# Patient Record
Sex: FEMALE | Race: WHITE | NOT HISPANIC OR LATINO | Employment: FULL TIME | URBAN - METROPOLITAN AREA
[De-identification: names, ages, dates, MRNs, and addresses within clinical notes are randomized per-mention and may not be internally consistent; named-entity substitution may affect disease eponyms.]

---

## 2018-02-23 ENCOUNTER — APPOINTMENT (OUTPATIENT)
Dept: RADIOLOGY | Facility: MEDICAL CENTER | Age: 23
End: 2018-02-23
Payer: OTHER MISCELLANEOUS

## 2018-02-23 ENCOUNTER — HOSPITAL ENCOUNTER (EMERGENCY)
Facility: MEDICAL CENTER | Age: 23
End: 2018-02-23
Attending: EMERGENCY MEDICINE
Payer: OTHER MISCELLANEOUS

## 2018-02-23 VITALS
HEIGHT: 57 IN | TEMPERATURE: 98.7 F | HEART RATE: 56 BPM | BODY MASS INDEX: 23.73 KG/M2 | WEIGHT: 110 LBS | SYSTOLIC BLOOD PRESSURE: 108 MMHG | DIASTOLIC BLOOD PRESSURE: 71 MMHG | RESPIRATION RATE: 18 BRPM

## 2018-02-23 DIAGNOSIS — S16.1XXA STRAIN OF NECK MUSCLE, INITIAL ENCOUNTER: ICD-10-CM

## 2018-02-23 DIAGNOSIS — S39.012A BACK STRAIN, INITIAL ENCOUNTER: ICD-10-CM

## 2018-02-23 LAB
APPEARANCE UR: CLEAR
COLOR UR AUTO: YELLOW
GLUCOSE UR QL STRIP.AUTO: NEGATIVE MG/DL
HCG UR QL: NEGATIVE
KETONES UR QL STRIP.AUTO: NEGATIVE MG/DL
LEUKOCYTE ESTERASE UR QL STRIP.AUTO: ABNORMAL
NITRITE UR QL STRIP.AUTO: NEGATIVE
PH UR STRIP.AUTO: 5.5 [PH]
PROT UR QL STRIP: NEGATIVE MG/DL
RBC UR QL AUTO: NEGATIVE
SP GR UR: 1.02

## 2018-02-23 PROCEDURE — 70450 CT HEAD/BRAIN W/O DYE: CPT

## 2018-02-23 PROCEDURE — 81025 URINE PREGNANCY TEST: CPT

## 2018-02-23 PROCEDURE — 81002 URINALYSIS NONAUTO W/O SCOPE: CPT

## 2018-02-23 PROCEDURE — 99284 EMERGENCY DEPT VISIT MOD MDM: CPT

## 2018-02-23 PROCEDURE — A9270 NON-COVERED ITEM OR SERVICE: HCPCS | Performed by: EMERGENCY MEDICINE

## 2018-02-23 PROCEDURE — 700102 HCHG RX REV CODE 250 W/ 637 OVERRIDE(OP): Performed by: EMERGENCY MEDICINE

## 2018-02-23 PROCEDURE — 72128 CT CHEST SPINE W/O DYE: CPT

## 2018-02-23 PROCEDURE — 72125 CT NECK SPINE W/O DYE: CPT

## 2018-02-23 RX ORDER — CYCLOBENZAPRINE HCL 10 MG
10 TABLET ORAL ONCE
Status: COMPLETED | OUTPATIENT
Start: 2018-02-23 | End: 2018-02-23

## 2018-02-23 RX ORDER — IBUPROFEN 600 MG/1
600 TABLET ORAL ONCE
Status: COMPLETED | OUTPATIENT
Start: 2018-02-23 | End: 2018-02-23

## 2018-02-23 RX ADMIN — IBUPROFEN 600 MG: 600 TABLET, FILM COATED ORAL at 20:47

## 2018-02-23 RX ADMIN — CYCLOBENZAPRINE 10 MG: 10 TABLET, FILM COATED ORAL at 19:39

## 2018-02-23 ASSESSMENT — LIFESTYLE VARIABLES: DO YOU DRINK ALCOHOL: YES

## 2018-02-23 ASSESSMENT — PAIN SCALES - GENERAL
PAINLEVEL_OUTOF10: 8
PAINLEVEL_OUTOF10: 8

## 2018-02-23 NOTE — LETTER
Emergency Services     February 23, 2018    Patient: Sylwia Downing   YOB: 1995   Date of Visit: 2/23/2018       To Whom It May Concern:    Sylwia Downing was seen and treated in our emergency department on 2/23/2018.   Please excuse her from work 2/24/18 and 2/25/18.    Sincerely,         [facility], EMERGENCY DEPT  Dept: [phone]

## 2018-02-24 NOTE — DISCHARGE INSTRUCTIONS
Cervical Sprain  A cervical sprain is when the tissues (ligaments) that hold the neck bones in place stretch or tear.  HOME CARE   · Put ice on the injured area.  ¨ Put ice in a plastic bag.  ¨ Place a towel between your skin and the bag.  ¨ Leave the ice on for 15-20 minutes, 3-4 times a day.  · You may have been given a collar to wear. This collar keeps your neck from moving while you heal.  ¨ Do not take the collar off unless told by your doctor.  ¨ If you have long hair, keep it outside of the collar.  ¨ Ask your doctor before changing the position of your collar. You may need to change its position over time to make it more comfortable.  ¨ If you are allowed to take off the collar for cleaning or bathing, follow your doctor's instructions on how to do it safely.  ¨ Keep your collar clean by wiping it with mild soap and water. Dry it completely. If the collar has removable pads, remove them every 1-2 days to hand wash them with soap and water. Allow them to air dry. They should be dry before you wear them in the collar.  ¨ Do not drive while wearing the collar.  · Only take medicine as told by your doctor.  · Keep all doctor visits as told.  · Keep all physical therapy visits as told.  · Adjust your work station so that you have good posture while you work.  · Avoid positions and activities that make your problems worse.  · Warm up and stretch before being active.  GET HELP IF:  · Your pain is not controlled with medicine.  · You cannot take less pain medicine over time as planned.  · Your activity level does not improve as expected.  GET HELP RIGHT AWAY IF:   · You are bleeding.  · Your stomach is upset.  · You have an allergic reaction to your medicine.  · You develop new problems that you cannot explain.  · You lose feeling (become numb) or you cannot move any part of your body (paralysis).  · You have tingling or weakness in any part of your body.  · Your symptoms get worse. Symptoms include:  ¨ Pain,  soreness, stiffness, puffiness (swelling), or a burning feeling in your neck.  ¨ Pain when your neck is touched.  ¨ Shoulder or upper back pain.  ¨ Limited ability to move your neck.  ¨ Headache.  ¨ Dizziness.  ¨ Your hands or arms feel week, lose feeling, or tingle.  ¨ Muscle spasms.  ¨ Difficulty swallowing or chewing.  MAKE SURE YOU:   · Understand these instructions.  · Will watch your condition.  · Will get help right away if you are not doing well or get worse.     This information is not intended to replace advice given to you by your health care provider. Make sure you discuss any questions you have with your health care provider.     Document Released: 06/05/2009 Document Revised: 08/20/2014 Document Reviewed: 06/25/2014  [employer] Interactive Patient Education ©2016 [employer]      Muscle Strain  A muscle strain (pulled muscle) happens when a muscle is stretched beyond normal length. It happens when a sudden, violent force stretches your muscle too far. Usually, a few of the fibers in your muscle are torn. Muscle strain is common in athletes. Recovery usually takes 1-2 weeks. Complete healing takes 5-6 weeks.   HOME CARE   · Follow the PRICE method of treatment to help your injury get better. Do this the first 2-3 days after the injury:  ¨ Protect. Protect the muscle to keep it from getting injured again.  ¨ Rest. Limit your activity and rest the injured body part.  ¨ Ice. Put ice in a plastic bag. Place a towel between your skin and the bag. Then, apply the ice and leave it on from 15-20 minutes each hour. After the third day, switch to moist heat packs.  ¨ Compression. Use a splint or elastic bandage on the injured area for comfort. Do not put it on too tightly.  ¨ Elevate. Keep the injured body part above the level of your heart.  · Only take medicine as told by your doctor.  · Warm up before doing exercise to prevent future muscle strains.  GET HELP IF:   · You have more pain or puffiness (swelling)  in the injured area.  · You feel numbness, tingling, or notice a loss of strength in the injured area.  MAKE SURE YOU:   · Understand these instructions.  · Will watch your condition.  · Will get help right away if you are not doing well or get worse.     This information is not intended to replace advice given to you by your health care provider. Make sure you discuss any questions you have with your health care provider.     Document Released: 09/26/2009 Document Revised: 10/08/2014 Document Reviewed: 07/17/2014  [employer] Interactive Patient Education ©2016 [employer]

## 2018-02-24 NOTE — ED NOTES
Pt medicated for headache per orders.  at bedside to speak with pt about her results.Discharge instructions given to patient, a verbal understanding of all instructions was stated. IV removed, cathlon intact, site without s/s of infection. Pt preferred to walk out accompanied by friend VSS, all belongings accounted for.
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
## 2018-05-16 ENCOUNTER — HOSPITAL ENCOUNTER (OUTPATIENT)
Facility: MEDICAL CENTER | Age: 23
End: 2018-05-16
Attending: NURSE PRACTITIONER
Payer: MEDICAID

## 2018-05-16 ENCOUNTER — INITIAL PRENATAL (OUTPATIENT)
Dept: OBGYN | Facility: CLINIC | Age: 23
End: 2018-05-16
Payer: MEDICAID

## 2018-05-16 VITALS
WEIGHT: 106 LBS | SYSTOLIC BLOOD PRESSURE: 102 MMHG | BODY MASS INDEX: 21.37 KG/M2 | HEIGHT: 59 IN | DIASTOLIC BLOOD PRESSURE: 52 MMHG

## 2018-05-16 DIAGNOSIS — Z34.80 SUPERVISION OF OTHER NORMAL PREGNANCY, ANTEPARTUM: Primary | ICD-10-CM

## 2018-05-16 DIAGNOSIS — Z34.00 SUPERVISION OF NORMAL FIRST PREGNANCY, ANTEPARTUM: ICD-10-CM

## 2018-05-16 LAB
APPEARANCE UR: NORMAL
BILIRUB UR STRIP-MCNC: NORMAL MG/DL
COLOR UR AUTO: NORMAL
GLUCOSE UR STRIP.AUTO-MCNC: NEGATIVE MG/DL
KETONES UR STRIP.AUTO-MCNC: NEGATIVE MG/DL
LEUKOCYTE ESTERASE UR QL STRIP.AUTO: NORMAL
NITRITE UR QL STRIP.AUTO: NEGATIVE
PH UR STRIP.AUTO: 6.5 [PH] (ref 5–8)
PROT UR QL STRIP: NEGATIVE MG/DL
RBC UR QL AUTO: NEGATIVE
SP GR UR STRIP.AUTO: 1.01
UROBILINOGEN UR STRIP-MCNC: NORMAL MG/DL

## 2018-05-16 PROCEDURE — 87491 CHLMYD TRACH DNA AMP PROBE: CPT

## 2018-05-16 PROCEDURE — 81002 URINALYSIS NONAUTO W/O SCOPE: CPT | Performed by: NURSE PRACTITIONER

## 2018-05-16 PROCEDURE — 88175 CYTOPATH C/V AUTO FLUID REDO: CPT

## 2018-05-16 PROCEDURE — 87591 N.GONORRHOEAE DNA AMP PROB: CPT

## 2018-05-16 PROCEDURE — 59401 PR NEW OB VISIT: CPT | Performed by: NURSE PRACTITIONER

## 2018-05-16 RX ORDER — ONDANSETRON 4 MG/1
4 TABLET, ORALLY DISINTEGRATING ORAL EVERY 6 HOURS PRN
Qty: 10 TAB | Refills: 1 | Status: SHIPPED | OUTPATIENT
Start: 2018-05-16 | End: 2018-11-08 | Stop reason: SDUPTHER

## 2018-05-16 NOTE — PROGRESS NOTES
Pt here today for NOB visit  LMP: Unknown  Pt had US done at [facility] ER on 04/16/18. Pt states was seen due to nausea and fever.   WT:106 lb  BP: 102/52  Pt states she still having some nausea, but states is improving. States having a little bit of cramping that comes and goes.  Desires AFP  Pt states she recently had a pap smear done 4 months at the [facility] Pt states results were abnormal-   Good # [phone]

## 2018-05-16 NOTE — LETTER
Cystic Fibrosis Carrier Testing  Sylwia Dowinng    The following information is about a blood test that can be done to determine if you and/or your partner carry the gene for cystic fibrosis.    WHAT IS CYSTIC FIBROSIS?  · Cystic fibrosis (CF) is an inherited disease that affects more than 25,000 American children and young adults.  · Symptoms of CF vary but include lung congestion, pneumonia, diarrhea and poor growth.  Most people with CF have severe medical problems and some die at a young age.  Others have so few symptoms they are unaware they have CF.  · CF does not affect intelligence.  · Although there is no cure for CF at this time, scientists are making progress in improving treatment and in searching for a cure.  In the past many people with CF  at a very young age.  Today, many are living into their 20’s and 30’s.    IS THERE A CHANCE MY BABY COULD HAVE CYSTIC FIBROSIS?  · You can have a child with CF even if there is no history in your family (see chart below).  · CF testing can help determine if you are a carrier and at risk to have a child with CF.  Note: if both parents are carriers, there is a 1 in 4 (25%) chance with each pregnancy that they will have a child with CF.  · Carriers have one normal CF gene and one altered CF gene.  · People with CF have two altered CF genes.  · Most people have two normal copies of the CF gene.    Approximate risk that a couple with no family history of cystic fibrosis will have a child with cystic fibrosis:    Ethnic background / Risk     couple:  1 in 2,500   couple:  1 in 15,000            couple:  1 in 8,000     American couple:  1 in 32,000     WHAT TESTING IS AVAILABLE?  · There is a blood test that can be done to find out if you or your partner is a carrier.  · It is important to understand that CF carrier testing does not detect all CF carriers.  · If the test shows that you are both CF carriers, you unborn baby  can be tested to find out if the baby has CF.    HOW MUCH DOES IT COST TO HAVE CYSTIC FIBROSIS CARRIER TESTING?  · Cost and insurance coverage for CF carrier testing vary depending upon the laboratory used and your insurance policy.  · The average cost for CF carrier testing is $300 per person.  · Your genetic counselor can provide you with more information about cystic fibrosis carrier testing.    _____  Yes, I am interested in discussing carrier testing with a genetic counselor.    _____  No, I am not interested in CF carrier testing or in receiving more information about CF carrier testing.      Client signature: ________________________________________  5/16/2018

## 2018-05-16 NOTE — PROGRESS NOTES
S:  Sylwia Downing is a 22 y.o.  who presents for her new OB exam.  She is 10w2d with and DANITZA of Estimated Date of Delivery: 12/10/18 by [place] ultrasound. She has complaints of sore breasts, some nausea and occasional cramping  She is currently working as a . Some heavy lifting but no chemical exposure. One ER visits or previous care in this pregnancy at [place] for n/v with confirmation of pregnancy ultrasound done.      desires AFP.  declines CF.  Denies VB, LOF, or cramping.  Denies dysuria, vaginal DC. Reports no fetal movement.     Pt is engaged and lives with FOB.  Pregnancy is desired.      Past Medical History:   Diagnosis Date   • Peptic ulcer     at age 15 - 18 yrs old     Family History   Problem Relation Age of Onset   • Stroke Mother    • Cancer Father      had thyroid cancer   • Cancer Maternal Grandmother      had breast cancer   • Cancer Paternal Grandmother      unkown cancer   • Cancer Paternal Grandfather      had lung canser     Social History     Social History   • Marital status: Single     Spouse name: N/A   • Number of children: N/A   • Years of education: N/A     Occupational History   • Not on file.     Social History Main Topics   • Smoking status: Former Smoker     Packs/day: 0.50     Years: 3.00     Types: Cigarettes   • Smokeless tobacco: Never Used      Comment: Quit smoking when she found out she waspregnant   • Alcohol use No      Comment: rare   • Drug use: Yes     Types: Inhaled, Marijuana      Comment: smoked marijuana when she was in high school   • Sexual activity: Yes     Partners: Male     Birth control/ protection: Pill      Comment: Unplanned pregnancy     Other Topics Concern   • Not on file     Social History Narrative   • No narrative on file     OB History    Para Term  AB Living   1             SAB TAB Ectopic Molar Multiple Live Births                    # Outcome Date GA Lbr Juan/2nd Weight Sex Delivery Anes PTL Lv   1 Current     "               History of HSV I or II in self or partner: no  History of Thyroid problems: no    O:    Vitals:    05/16/18 1516   BP: 102/52   Weight: 48.1 kg (106 lb)   Height: 1.49 m (4' 10.66\")      See Prenatal Physical.    Wet mount: none      A:   1.  IUP @ 10w2d per [place]'s ultrasound        2.  S=D        3.  See problem list below       4       Patient Active Problem List    Diagnosis Date Noted   • Supervision of normal first pregnancy 05/16/2018         P:  1.  GC/CT & pap done        2.  Prenatal labs ordered - lab slip given        3.  Discussed PNV, diet, avoidances and adequate water intake        4.  NOB packet given        5.  Return to office in 4 wks        6.  Complete OB US in 8 wks          No orders of the defined types were placed in this encounter.      "

## 2018-05-16 NOTE — PROGRESS NOTES
"Subjective:      Sylwia Downing is a 22 y.o. female who presents with No chief complaint on file.            HPI    ROS       Objective:     /52   Ht 1.49 m (4' 10.66\")   Wt 48.1 kg (106 lb)   BMI 21.66 kg/m²      Physical Exam   Constitutional: She is oriented to person, place, and time. She appears well-developed and well-nourished.   HENT:   Head: Normocephalic.   Eyes: Pupils are equal, round, and reactive to light.   Neck: Normal range of motion. No thyromegaly present.   Cardiovascular: Normal rate, regular rhythm and normal heart sounds.    Pulmonary/Chest: Effort normal and breath sounds normal.   Abdominal: Soft. Bowel sounds are normal.   Genitourinary: Vagina normal and uterus normal.   Musculoskeletal: Normal range of motion.   Neurological: She is alert and oriented to person, place, and time.   Skin: Skin is warm and dry.   Psychiatric: She has a normal mood and affect. Her behavior is normal. Judgment and thought content normal.   Nursing note and vitals reviewed.              Assessment/Plan:     1. Supervision of other normal pregnancy, antepartum    - POCT Urinalysis  - US-OB 2ND 3RD TRI COMPLETE; Future  - PREG CNTR PRENATAL PN; Future  - THINPREP RFLX HPV ASCUS W/CTNG; Future    2. Supervision of normal first pregnancy        "

## 2018-05-18 LAB
C TRACH DNA GENITAL QL NAA+PROBE: NEGATIVE
CYTOLOGY REG CYTOL: NORMAL
N GONORRHOEA DNA GENITAL QL NAA+PROBE: NEGATIVE
SPECIMEN SOURCE: NORMAL

## 2018-06-14 ENCOUNTER — ROUTINE PRENATAL (OUTPATIENT)
Dept: OBGYN | Facility: CLINIC | Age: 23
End: 2018-06-14
Payer: MEDICAID

## 2018-06-14 VITALS — BODY MASS INDEX: 22.88 KG/M2 | DIASTOLIC BLOOD PRESSURE: 62 MMHG | WEIGHT: 112 LBS | SYSTOLIC BLOOD PRESSURE: 100 MMHG

## 2018-06-14 DIAGNOSIS — Z34.00 SUPERVISION OF NORMAL FIRST PREGNANCY, ANTEPARTUM: Primary | ICD-10-CM

## 2018-06-14 PROCEDURE — 90040 PR PRENATAL FOLLOW UP: CPT | Performed by: NURSE PRACTITIONER

## 2018-06-14 RX ORDER — PNV NO.95/FERROUS FUM/FOLIC AC 28MG-0.8MG
1 TABLET ORAL DAILY
Qty: 30 TAB | Refills: 6 | Status: ON HOLD | OUTPATIENT
Start: 2018-06-14 | End: 2018-12-15

## 2018-06-14 NOTE — PROGRESS NOTES
Pt here today for OB follow up  Pt states no complaints   Reports +  Good # [phone]  Pharmacy Confirmed.  Pt has U/S on 7/26/18  Pt declined AFP  Pt states will do Labs after OB appt.

## 2018-06-14 NOTE — PROGRESS NOTES
SUBJECTIVE:  Pt is a 22 y.o.   at 14w3d  gestation. Presents today for follow-up prenatal care. Had one visit to [place]. Wasn't feeling well at work and her employer wanted her to go to the hospital. Ultrasound done with records requested. Reports no issues at this time. N/V now resolving.  Reports perhaps positive  fetal movement. Denies cramping/contractions, bleeding or leaking of fluid. Denies dysuria, headaches, N/V, or other issues at this time. Generally feels well today.     OBJECTIVE:  - See prenatal vitals flow  -   Vitals:    18 1345   BP: 100/62   Weight: 50.8 kg (112 lb)      Labs - none  US - scheduled  US at [place] requested           ASSESSMENT:   - IUP at 14w3d    - S=D   -   Patient Active Problem List    Diagnosis Date Noted   • Supervision of normal first pregnancy 2018         PLAN:  - S/sx pregnancy and labor warning signs vs general discomforts discussed  - Fetal movements and kick counts reviewed   - Adequate hydration reinforced  - Nutrition/exercise/vitamin education; continued PNV  - patient is on her way to the lab now.

## 2018-06-26 ENCOUNTER — HOSPITAL ENCOUNTER (OUTPATIENT)
Dept: LAB | Facility: MEDICAL CENTER | Age: 23
End: 2018-06-26
Attending: NURSE PRACTITIONER
Payer: MEDICAID

## 2018-06-26 DIAGNOSIS — Z34.80 SUPERVISION OF OTHER NORMAL PREGNANCY, ANTEPARTUM: ICD-10-CM

## 2018-06-26 LAB
ABO GROUP BLD: NORMAL
APPEARANCE UR: CLEAR
BACTERIA #/AREA URNS HPF: ABNORMAL /HPF
BASOPHILS # BLD AUTO: 0.6 % (ref 0–1.8)
BASOPHILS # BLD: 0.08 K/UL (ref 0–0.12)
BILIRUB UR QL STRIP.AUTO: NEGATIVE
BLD GP AB SCN SERPL QL: NORMAL
COLOR UR: ABNORMAL
EOSINOPHIL # BLD AUTO: 0.2 K/UL (ref 0–0.51)
EOSINOPHIL NFR BLD: 1.6 % (ref 0–6.9)
EPI CELLS #/AREA URNS HPF: ABNORMAL /HPF
ERYTHROCYTE [DISTWIDTH] IN BLOOD BY AUTOMATED COUNT: 42.4 FL (ref 35.9–50)
GLUCOSE UR STRIP.AUTO-MCNC: NEGATIVE MG/DL
HBV SURFACE AG SER QL: NEGATIVE
HCT VFR BLD AUTO: 36.9 % (ref 37–47)
HGB BLD-MCNC: 12.7 G/DL (ref 12–16)
HIV 1+2 AB+HIV1 P24 AG SERPL QL IA: NON REACTIVE
HYALINE CASTS #/AREA URNS LPF: ABNORMAL /LPF
IMM GRANULOCYTES # BLD AUTO: 0.05 K/UL (ref 0–0.11)
IMM GRANULOCYTES NFR BLD AUTO: 0.4 % (ref 0–0.9)
KETONES UR STRIP.AUTO-MCNC: NEGATIVE MG/DL
LEUKOCYTE ESTERASE UR QL STRIP.AUTO: ABNORMAL
LYMPHOCYTES # BLD AUTO: 2.46 K/UL (ref 1–4.8)
LYMPHOCYTES NFR BLD: 19.9 % (ref 22–41)
MCH RBC QN AUTO: 31.5 PG (ref 27–33)
MCHC RBC AUTO-ENTMCNC: 34.4 G/DL (ref 33.6–35)
MCV RBC AUTO: 91.6 FL (ref 81.4–97.8)
MICRO URNS: ABNORMAL
MONOCYTES # BLD AUTO: 0.94 K/UL (ref 0–0.85)
MONOCYTES NFR BLD AUTO: 7.6 % (ref 0–13.4)
NEUTROPHILS # BLD AUTO: 8.63 K/UL (ref 2–7.15)
NEUTROPHILS NFR BLD: 69.9 % (ref 44–72)
NITRITE UR QL STRIP.AUTO: NEGATIVE
NRBC # BLD AUTO: 0 K/UL
NRBC BLD-RTO: 0 /100 WBC
PH UR STRIP.AUTO: 7 [PH]
PLATELET # BLD AUTO: 302 K/UL (ref 164–446)
PMV BLD AUTO: 10.4 FL (ref 9–12.9)
PROT UR QL STRIP: NEGATIVE MG/DL
RBC # BLD AUTO: 4.03 M/UL (ref 4.2–5.4)
RBC # URNS HPF: ABNORMAL /HPF
RBC UR QL AUTO: NEGATIVE
RH BLD: NORMAL
RUBV AB SER QL: 26.1 IU/ML
SP GR UR STRIP.AUTO: 1.01
TREPONEMA PALLIDUM IGG+IGM AB [PRESENCE] IN SERUM OR PLASMA BY IMMUNOASSAY: NON REACTIVE
UROBILINOGEN UR STRIP.AUTO-MCNC: 0.2 MG/DL
WBC # BLD AUTO: 12.4 K/UL (ref 4.8–10.8)
WBC #/AREA URNS HPF: ABNORMAL /HPF

## 2018-06-26 PROCEDURE — 36415 COLL VENOUS BLD VENIPUNCTURE: CPT

## 2018-06-26 PROCEDURE — 87389 HIV-1 AG W/HIV-1&-2 AB AG IA: CPT

## 2018-06-26 PROCEDURE — 86850 RBC ANTIBODY SCREEN: CPT

## 2018-06-26 PROCEDURE — 86900 BLOOD TYPING SEROLOGIC ABO: CPT

## 2018-06-26 PROCEDURE — 87340 HEPATITIS B SURFACE AG IA: CPT

## 2018-06-26 PROCEDURE — 86762 RUBELLA ANTIBODY: CPT

## 2018-06-26 PROCEDURE — 86901 BLOOD TYPING SEROLOGIC RH(D): CPT

## 2018-06-26 PROCEDURE — 85025 COMPLETE CBC W/AUTO DIFF WBC: CPT

## 2018-06-26 PROCEDURE — 87086 URINE CULTURE/COLONY COUNT: CPT

## 2018-06-26 PROCEDURE — 86780 TREPONEMA PALLIDUM: CPT

## 2018-06-26 PROCEDURE — 81001 URINALYSIS AUTO W/SCOPE: CPT

## 2018-06-28 LAB
BACTERIA UR CULT: NORMAL
SIGNIFICANT IND 70042: NORMAL
SITE SITE: NORMAL
SOURCE SOURCE: NORMAL

## 2018-07-12 ENCOUNTER — ROUTINE PRENATAL (OUTPATIENT)
Dept: OBGYN | Facility: CLINIC | Age: 23
End: 2018-07-12
Payer: MEDICAID

## 2018-07-12 VITALS — BODY MASS INDEX: 24.72 KG/M2 | SYSTOLIC BLOOD PRESSURE: 118 MMHG | DIASTOLIC BLOOD PRESSURE: 56 MMHG | WEIGHT: 121 LBS

## 2018-07-12 DIAGNOSIS — Z34.00 SUPERVISION OF NORMAL FIRST PREGNANCY, ANTEPARTUM: ICD-10-CM

## 2018-07-12 PROCEDURE — 90040 PR PRENATAL FOLLOW UP: CPT | Performed by: NURSE PRACTITIONER

## 2018-07-12 NOTE — PROGRESS NOTES
Pt here today for OB follow up  Pt states having some milky white discharge, denies odor or itch  Reports +  Good # [phone]  Pharmacy Confirmed.  U/S 7/26  Declined AFP

## 2018-07-12 NOTE — PROGRESS NOTES
SUBJECTIVE:  Pt is a 22 y.o.   at 18w3d  gestation. Presents today for follow-up prenatal care. Reports no issues at this time.  Reports  fetal movement. Denies cramping/contractions, bleeding or leaking of fluid. Denies dysuria, headaches, N/V, or other issues at this time. Generally feels well today.     OBJECTIVE:  - See prenatal vitals flow  -   Vitals:    18 1356   BP: 118/56   Weight: 54.9 kg (121 lb)                 ASSESSMENT:   - IUP at 18w3d    - S=D   -   Patient Active Problem List    Diagnosis Date Noted   • Supervision of normal first pregnancy 2018         PLAN:  - declination of AFP signed   - US   - S/sx pregnancy and labor warning signs vs general discomforts discussed  - Fetal movements and kick counts reviewed   - Adequate hydration reinforced  - Nutrition/exercise/vitamin education; continued PNV  - Pregnancy weight gain reccs reviewed, nutrition and exercise reviewed   - Anticipatory guidance given  - RTC in 4 weeks for follow-up prenatal care

## 2018-07-26 ENCOUNTER — APPOINTMENT (OUTPATIENT)
Dept: RADIOLOGY | Facility: IMAGING CENTER | Age: 23
End: 2018-07-26
Attending: NURSE PRACTITIONER
Payer: MEDICAID

## 2018-07-26 ENCOUNTER — DATING (OUTPATIENT)
Dept: OBGYN | Facility: CLINIC | Age: 23
End: 2018-07-26

## 2018-07-26 DIAGNOSIS — Z34.80 SUPERVISION OF OTHER NORMAL PREGNANCY, ANTEPARTUM: ICD-10-CM

## 2018-07-26 PROCEDURE — 76805 OB US >/= 14 WKS SNGL FETUS: CPT | Performed by: OBSTETRICS & GYNECOLOGY

## 2018-08-10 ENCOUNTER — ROUTINE PRENATAL (OUTPATIENT)
Dept: OBGYN | Facility: CLINIC | Age: 23
End: 2018-08-10
Payer: MEDICAID

## 2018-08-10 VITALS — WEIGHT: 123 LBS | SYSTOLIC BLOOD PRESSURE: 114 MMHG | DIASTOLIC BLOOD PRESSURE: 58 MMHG | BODY MASS INDEX: 25.13 KG/M2

## 2018-08-10 DIAGNOSIS — Z34.00 SUPERVISION OF NORMAL FIRST PREGNANCY, ANTEPARTUM: ICD-10-CM

## 2018-08-10 PROCEDURE — 90040 PR PRENATAL FOLLOW UP: CPT | Performed by: OBSTETRICS & GYNECOLOGY

## 2018-08-10 NOTE — PROGRESS NOTES
OB f/u. + fetal movement.  No VB, LOF or UC's.  Good phone # [phone]  3rd trimester lab orders pended and instructions given to patient  U/S done on 7-26-18  Pt denies any problems

## 2018-08-10 NOTE — PROGRESS NOTES
OB Followup;    22w4d    Patient Active Problem List    Diagnosis Date Noted   • Supervision of normal first pregnancy 05/16/2018       Vitals:    08/10/18 1557   BP: 114/58   Weight: 55.8 kg (123 lb)       Patient presents for followup of OB care. Currently doing well . Good fetal movement no leakage of fluid no contractions or vaginal bleeding        Size equals dates, normal fetal heart rate      Labs; patient given lab slip for CBC, GTT and RPR    Labor precautions given  Increase oral hydration  Discussed proper weight gain during pregnancy  Continue prenatal vitamins  Increase water intake  Reviewed our group's policy on prenatal visits with all providers in the group    Followup in  4 weeks

## 2018-08-31 ENCOUNTER — HOSPITAL ENCOUNTER (OUTPATIENT)
Dept: LAB | Facility: MEDICAL CENTER | Age: 23
End: 2018-08-31
Attending: OBSTETRICS & GYNECOLOGY
Payer: MEDICAID

## 2018-08-31 DIAGNOSIS — Z34.00 SUPERVISION OF NORMAL FIRST PREGNANCY, ANTEPARTUM: ICD-10-CM

## 2018-08-31 LAB
GLUCOSE 1H P 50 G GLC PO SERPL-MCNC: 122 MG/DL (ref 70–139)
HCT VFR BLD AUTO: 38.4 % (ref 37–47)
HGB BLD-MCNC: 12.9 G/DL (ref 12–16)
TREPONEMA PALLIDUM IGG+IGM AB [PRESENCE] IN SERUM OR PLASMA BY IMMUNOASSAY: NON REACTIVE

## 2018-08-31 PROCEDURE — 86780 TREPONEMA PALLIDUM: CPT

## 2018-08-31 PROCEDURE — 85014 HEMATOCRIT: CPT

## 2018-08-31 PROCEDURE — 82950 GLUCOSE TEST: CPT

## 2018-08-31 PROCEDURE — 85018 HEMOGLOBIN: CPT

## 2018-08-31 PROCEDURE — 36415 COLL VENOUS BLD VENIPUNCTURE: CPT

## 2018-09-12 ENCOUNTER — ROUTINE PRENATAL (OUTPATIENT)
Dept: OBGYN | Facility: CLINIC | Age: 23
End: 2018-09-12
Payer: MEDICAID

## 2018-09-12 VITALS — SYSTOLIC BLOOD PRESSURE: 110 MMHG | WEIGHT: 129 LBS | BODY MASS INDEX: 26.36 KG/M2 | DIASTOLIC BLOOD PRESSURE: 62 MMHG

## 2018-09-12 DIAGNOSIS — Z34.00 SUPERVISION OF NORMAL FIRST PREGNANCY, ANTEPARTUM: ICD-10-CM

## 2018-09-12 PROCEDURE — 90040 PR PRENATAL FOLLOW UP: CPT | Performed by: OBSTETRICS & GYNECOLOGY

## 2018-09-12 PROCEDURE — 90715 TDAP VACCINE 7 YRS/> IM: CPT | Performed by: OBSTETRICS & GYNECOLOGY

## 2018-09-12 PROCEDURE — 90471 IMMUNIZATION ADMIN: CPT | Performed by: OBSTETRICS & GYNECOLOGY

## 2018-09-12 NOTE — PROGRESS NOTES
Pt here today for OB follow up  Pt states no complaints  Reports +FM   Good # [phone]  Pharmacy Confirmed.  Chaperone offered and present.    ARTURO Sheet instructions given  Pt desires Tdap  Declines BTL

## 2018-09-12 NOTE — PROGRESS NOTES
S: Pt presents for routine OB follow up. Good fetal movement.  No contractions, vaginal bleeding, or leakage of fluid.    Questions answered.    O: /62   Wt 58.5 kg (129 lb)   BMI 26.36 kg/m²   Patients' weight gain, fluid intake and exercise level discussed.  Vitals, fundal height , fetal position, and FHR reviewed on flowsheet    Lab:  Recent Results (from the past 336 hour(s))   GLUCOSE 1HR GESTATIONAL    Collection Time: 18 11:22 AM   Result Value Ref Range    Glucose, Post Dose 122 70 - 139 mg/dL   HCT    Collection Time: 18 11:22 AM   Result Value Ref Range    Hematocrit 38.4 37.0 - 47.0 %   HGB    Collection Time: 18 11:22 AM   Result Value Ref Range    Hemoglobin 12.9 12.0 - 16.0 g/dL   T.PALLIDUM AB EIA    Collection Time: 18 11:22 AM   Result Value Ref Range    Syphilis, Treponemal Qual Non Reactive Non Reactive       A/P:  22 y.o.  at 27w2d presents for routine obstetric follow-up.  Size equals dates and/or scan    1.  Continue prenatal vitamins.  2.  Fetal kick counts.  3.  Exercise at least 30 minutes daily.  4.  Increase water intake.  5.  Labor precautions educated.  6.  Follow-up in 3 weeks.  7.  GBS at 35 weeks

## 2018-09-12 NOTE — LETTER
"Count Your Baby's Movements  Another step to a healthy delivery    Sylwia Chang,             Dept: [phone]    How Many Weeks Pregnant? 27w2d    Date to Begin Countin18              How to use this chart    One way for your physician to keep track of your baby's health is by knowing how often the baby moves (or \"kicks\") in your womb.  You can help your physician to do this by using this chart every day.    Every day, you should see how many hours it takes for your baby to move 10 times.  Start in the morning, as soon as you get up.    · First, write down the time your baby moves until you get to 10.  · Check off one box every time your baby moves until you get to 10.  · Write down the time you finished counting in the last column.  · Total how long it took to count up all 10 movements.  · Finally, fill in the box that shows how long this took.  After counting 10 movements, you no longer have to count any more that day.  The next morning, just start counting again as soon as you get up.    What should you call a \"movement\"?  It is hard to say, because it will feel different from one mother to another and from one pregnancy to the next.  The important thing is that you count the movements the same way throughout your pregnancy.  If you have more questions, you should ask your physician.    Count carefully every day!  SAMPLE:  Week 28    How many hours did it take to feel 10 movements?       Start  Time     1     2     3     4     5     6     7     8     9     10   Finish Time   Mon 8:20 ·  ·  ·  ·  ·  ·  ·  ·  ·  ·  11:40                  Sat               Sun                 IMPORTANT: You should contact your physician if it takes more than two hours for you to feel 10 movements.  Each morning, write down the time and start to count the movements of your baby.  Keep track by checking off one box every time you feel one movement.  When you have felt " "10 \"kicks\", write down the time you finished counting in the last column.  Then fill in the   box (over the check akanksha) for the number of hours it took.  Be sure to read the complete instructions on the previous page.            "

## 2018-09-24 ENCOUNTER — TELEPHONE (OUTPATIENT)
Dept: OBGYN | Facility: CLINIC | Age: 23
End: 2018-09-24

## 2018-09-24 NOTE — TELEPHONE ENCOUNTER
Pt called Co tingling and numbness in right arm from elbow to fingertips for 5 days. Pt says she is a  and has been drinking water, has not injured herself, and has no other pain. She did state that she had chest pain a few days ago but not since then.   Pt advised to wear wrist support at night and lower salt intake in diet due to it most likely being water retention, per consult with Klalie Hayes.   Pt requested a note for work as she took today off. Galilea FRAZIER ok 'd it and patient was advised to pick it up from the office.  Pt. Verbalized understanding and had no other questions or concerns.

## 2018-10-04 ENCOUNTER — ROUTINE PRENATAL (OUTPATIENT)
Dept: OBGYN | Facility: CLINIC | Age: 23
End: 2018-10-04
Payer: MEDICAID

## 2018-10-04 VITALS — SYSTOLIC BLOOD PRESSURE: 126 MMHG | WEIGHT: 135 LBS | DIASTOLIC BLOOD PRESSURE: 60 MMHG | BODY MASS INDEX: 27.58 KG/M2

## 2018-10-04 DIAGNOSIS — Z34.00 SUPERVISION OF NORMAL FIRST PREGNANCY, ANTEPARTUM: ICD-10-CM

## 2018-10-04 PROCEDURE — 90040 PR PRENATAL FOLLOW UP: CPT | Performed by: PHYSICIAN ASSISTANT

## 2018-10-04 PROCEDURE — 90471 IMMUNIZATION ADMIN: CPT | Performed by: PHYSICIAN ASSISTANT

## 2018-10-04 PROCEDURE — 90686 IIV4 VACC NO PRSV 0.5 ML IM: CPT | Performed by: PHYSICIAN ASSISTANT

## 2018-10-04 NOTE — PROGRESS NOTES
Pt has no complaints with cramping, bleeding or pain. +FM. Pt has had some numbness in R arm and hand, avis during day, where pt works as  and writes often with that hand. Pt has incr water, cut back on salt, which has helped a little, but pt to try bracing at night, cool packs prn. Call if not improving. Flu vaccine given today. Tour info given today. RTC 2 wk or sooner prn.

## 2018-10-04 NOTE — PROGRESS NOTES
Pt. Here for OB/FU today. Reports Good FM.   Good # [phone]  Pt states having nausea and numbness on her right arm.   Pharmacy verified.   Flu vaccine given today, right deltoid. Screening checklist reviewed with pt. Verified by DV

## 2018-10-18 ENCOUNTER — ROUTINE PRENATAL (OUTPATIENT)
Dept: OBGYN | Facility: CLINIC | Age: 23
End: 2018-10-18

## 2018-10-18 VITALS — WEIGHT: 138 LBS | SYSTOLIC BLOOD PRESSURE: 124 MMHG | DIASTOLIC BLOOD PRESSURE: 64 MMHG | BODY MASS INDEX: 28.2 KG/M2

## 2018-10-18 DIAGNOSIS — Z34.00 SUPERVISION OF NORMAL FIRST PREGNANCY, ANTEPARTUM: Primary | ICD-10-CM

## 2018-10-18 PROCEDURE — 90040 PR PRENATAL FOLLOW UP: CPT | Performed by: NURSE PRACTITIONER

## 2018-10-18 NOTE — PROGRESS NOTES
S) Pt is a 22 y.o.   at 32w3d  gestation. Routine prenatal care today. No complaints today. Was sick last week, but feeling much better. Right hand numbness is getting better too. Tdap and flu already done.  labor precautions discussed, all questions answered.    Fetal movement Normal  Cramping no  VB no  LOF no   Denies dysuria. Generally feels well today. Good self-care activities identified. Denies headaches, swelling, visual changes, or epigastric pain .     O) Blood pressure 124/64, weight 62.6 kg (138 lb).        Labs:       PNL: WNL       GCT: 122        AFP: Not Examined       GBS: N/A       Pertinent ultrasound -        18- Survey WNL, LORRIE 17.17cm, c/w prev dating.    A) IUP at 32w3d       S=D         Patient Active Problem List    Diagnosis Date Noted   • Supervision of normal first pregnancy 2018          SVE: deferred       Chaperone offered: n/a         TDAP: yes       FLU: yes        BTL: no       : n/a       C/S Consent: n/a       IOL or C/S scheduled: no       LAST PAP: 18- Negative         P) s/s ptl vs general discomforts. Fetal movements reviewed. General ed and anticipatory guidance. Nutrition/exercise/vitamin. Plans breast Plans pp contraception- unsure  Continue PNV.

## 2018-10-18 NOTE — PROGRESS NOTES
Pt here today for OB follow up  Pt states no complaints  Reports +  Good # [phone]  Pharmacy Confirmed.  Chaperone offered and none needed.

## 2018-11-01 ENCOUNTER — ROUTINE PRENATAL (OUTPATIENT)
Dept: OBGYN | Facility: CLINIC | Age: 23
End: 2018-11-01

## 2018-11-01 VITALS — BODY MASS INDEX: 27.99 KG/M2 | DIASTOLIC BLOOD PRESSURE: 56 MMHG | SYSTOLIC BLOOD PRESSURE: 102 MMHG | WEIGHT: 137 LBS

## 2018-11-01 DIAGNOSIS — Z34.03 SUPERVISION OF NORMAL FIRST PREGNANCY IN THIRD TRIMESTER: ICD-10-CM

## 2018-11-01 PROCEDURE — 90040 PR PRENATAL FOLLOW UP: CPT | Performed by: NURSE PRACTITIONER

## 2018-11-01 NOTE — PROGRESS NOTES
Pt here today for OB follow up  Reports +FM  WT: 137 lb  BP: 102/56  Pt states she has been having pain on fingers both hands x 1 week. Also reports having nausea in the evening for the past few days.   Fahad # [phone]

## 2018-11-01 NOTE — PROGRESS NOTES
SUBJECTIVE:  Pt is a 22 y.o.   at 34w3d  gestation. Presents today for follow-up prenatal care. Reports no issues at this time except for sore fingers due to hand and pedal edema.  Reports good  fetal movement. Denies cramping/contractions, bleeding or leaking of fluid. Denies dysuria, headaches, N/V, or other issues at this time. Generally feels well today.     OBJECTIVE:  - See prenatal vitals flow  -   Vitals:    18 1425   BP: 102/56   Weight: 62.1 kg (137 lb)      Labs - normal prenatal panel  US - normal fetal survey            ASSESSMENT:   - IUP at 34w3d    - S=D   -   Patient Active Problem List    Diagnosis Date Noted   • Supervision of normal first pregnancy 2018         PLAN:  - S/sx pregnancy and labor warning signs vs general discomforts discussed  - Fetal movements and kick counts reviewed   - Adequate hydration reinforced  - Nutrition/exercise/vitamin education; continued PNV   Anticipates GBS next visit.

## 2018-11-13 ENCOUNTER — TELEPHONE (OUTPATIENT)
Dept: OBGYN | Facility: CLINIC | Age: 23
End: 2018-11-13

## 2018-11-13 NOTE — TELEPHONE ENCOUNTER
Called patient, no answer. Left voicemail to let her know her FMLA is done and ready to be picked up. No fax number provided. Scanned into patient's chart and original documents given back to Galilea.

## 2018-11-15 ENCOUNTER — HOSPITAL ENCOUNTER (OUTPATIENT)
Facility: MEDICAL CENTER | Age: 23
End: 2018-11-15
Attending: NURSE PRACTITIONER
Payer: COMMERCIAL

## 2018-11-15 ENCOUNTER — ROUTINE PRENATAL (OUTPATIENT)
Dept: OBGYN | Facility: CLINIC | Age: 23
End: 2018-11-15
Payer: MEDICAID

## 2018-11-15 ENCOUNTER — ROUTINE PRENATAL (OUTPATIENT)
Dept: OBGYN | Facility: CLINIC | Age: 23
End: 2018-11-15

## 2018-11-15 VITALS — SYSTOLIC BLOOD PRESSURE: 118 MMHG | DIASTOLIC BLOOD PRESSURE: 66 MMHG | BODY MASS INDEX: 28.4 KG/M2 | WEIGHT: 139 LBS

## 2018-11-15 DIAGNOSIS — Z34.00 SUPERVISION OF NORMAL FIRST PREGNANCY, ANTEPARTUM: ICD-10-CM

## 2018-11-15 DIAGNOSIS — Z34.00 SUPERVISION OF NORMAL FIRST PREGNANCY, ANTEPARTUM: Primary | ICD-10-CM

## 2018-11-15 DIAGNOSIS — O36.8390 ANTEPARTUM VARIABLE DECELERATION: ICD-10-CM

## 2018-11-15 LAB
NST ACOUSTIC STIMULATION: NO
NST ACTION NECESSARY: NORMAL
NST ASSESSMENT: NORMAL
NST BASELINE: 110
NST INDICATIONS: NORMAL
NST OTHER DATA: NORMAL
NST READ BY: NORMAL
NST RETURN: NORMAL
NST UTERINE ACTIVITY: NORMAL

## 2018-11-15 PROCEDURE — 59025 FETAL NON-STRESS TEST: CPT | Performed by: NURSE PRACTITIONER

## 2018-11-15 PROCEDURE — 90040 PR PRENATAL FOLLOW UP: CPT | Performed by: NURSE PRACTITIONER

## 2018-11-15 NOTE — PROGRESS NOTES
S) Pt is a 23 y.o.   at 36w3d  gestation. Routine prenatal care today. 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  Did hospital tour, no PNC.   Fetal movement Normal  Cramping yes  VB no  LOF no   Denies dysuria. Generally feels well today. Good self-care activities identified. Denies headaches, swelling, visual changes, or epigastric pain .     O) Blood pressure 118/66, weight 63 kg (139 lb).        Labs:       PNL: WNL       GCT: 122       AFP: Not Examined       GBS: collected today       Pertinent ultrasound - 18- Survey WNL, LORRIE 17.17cm, c/w prev dating.       Several quick decelerations in baby's HR heard on doppler    A) IUP at 36w3d       S=D         Patient Active Problem List    Diagnosis Date Noted   • Supervision of normal first pregnancy 2018          SVE: deferred         TDAP: yes       FLU: yes        BTL: no       : no       C/S Consent: no       IOL or C/S scheduled: no       LAST PAP: 18 negative         P) s/s ptl vs general discomforts.   Fetal movements reviewed. General ed and anticipatory guidance. Nutrition/exercise/vitamin. Plans breast Plans pp contraception- unsure    GBS collected today  NST now.  RTC 1 week or PRN

## 2018-11-15 NOTE — PROGRESS NOTES
Pt here today for OB follow up  Reports +FM  WT: 139 lb  BP: 118/66  Pt states she gets occasional cramping during the day. Pt also reports white mucus white vaginal discharge, denies itching or odor. Denies any other concerns.   Good # [phone]

## 2018-11-17 LAB — GP B STREP DNA SPEC QL NAA+PROBE: NEGATIVE

## 2018-11-20 ENCOUNTER — TELEPHONE (OUTPATIENT)
Dept: OBGYN | Facility: CLINIC | Age: 23
End: 2018-11-20

## 2018-11-20 NOTE — TELEPHONE ENCOUNTER
[11/19/2018 10:57 AM] Ally Bhat:   Sylwia Downing       Female  1995 (23 yrs)  [id number]  MRN: [id number]      pt want her FMLA pappr work ASAP  left a vm  her could be jeopardy  if she dont get this  thanks  [phone]     I called pt back and notify her that he FMLA were done on 11/12/18, I will fax forms per pt request.   Has not further questions.

## 2018-11-25 ENCOUNTER — HOSPITAL ENCOUNTER (OUTPATIENT)
Facility: MEDICAL CENTER | Age: 23
End: 2018-11-25
Attending: OBSTETRICS & GYNECOLOGY | Admitting: OBSTETRICS & GYNECOLOGY
Payer: MEDICAID

## 2018-11-25 VITALS
WEIGHT: 140 LBS | BODY MASS INDEX: 30.2 KG/M2 | DIASTOLIC BLOOD PRESSURE: 71 MMHG | SYSTOLIC BLOOD PRESSURE: 123 MMHG | HEIGHT: 57 IN | HEART RATE: 94 BPM

## 2018-11-25 PROCEDURE — 59025 FETAL NON-STRESS TEST: CPT

## 2018-11-25 ASSESSMENT — PAIN SCALES - GENERAL
PAINLEVEL_OUTOF10: 0
PAINLEVEL_OUTOF10: 0

## 2018-11-26 NOTE — PROGRESS NOTES
1936- Pt presented to labor unit with decreased fetal movement. Denies UC's, VB, and LOF. States last time she felt the baby move was last night. Abd soft and non tender to touch. EFM and TOCO applied. Pt states last time she was in the Dr office, HR was in the 120's. POC discussed. Pt verbalized understanding.     1950- Pt states she hasn't had much to drink today. Pitcher of water given to pt and instructed to consume. Pt agreed. FOB at bedside.    2027- GAVIN Alcantara CNM notified of pt status. Reviewed fetal heart tracing remotely. Order received to discharge home undelivered.     2028- Discharge instructions given and explained. Both pt and FOB verbalized understanding.     2030- Pt discharged home undelivered with FOB.

## 2018-11-29 ENCOUNTER — ROUTINE PRENATAL (OUTPATIENT)
Dept: OBGYN | Facility: CLINIC | Age: 23
End: 2018-11-29
Payer: MEDICAID

## 2018-11-29 VITALS — DIASTOLIC BLOOD PRESSURE: 64 MMHG | SYSTOLIC BLOOD PRESSURE: 108 MMHG | BODY MASS INDEX: 30.73 KG/M2 | WEIGHT: 142 LBS

## 2018-11-29 DIAGNOSIS — Z34.00 SUPERVISION OF NORMAL FIRST PREGNANCY, ANTEPARTUM: Primary | ICD-10-CM

## 2018-11-29 PROCEDURE — 90040 PR PRENATAL FOLLOW UP: CPT | Performed by: NURSE PRACTITIONER

## 2018-11-29 NOTE — PROGRESS NOTES
Pt here today for OB follow up. Pt seen L&D 11/25 -FM  Pt states some UC's, feels head lower  Reports +FM  Good # [phone]  Pharmacy Confirmed.  Chaperone offered and present.

## 2018-11-29 NOTE — PROGRESS NOTES
S) Pt is a 23 y.o.   at 38w3d  gestation. Routine prenatal care today. Seen in L&D on  for decreased FM.  Fetal movement since has been normal. Feeling more pelvic pressure but no contractions yet.    Fetal movement Normal  Cramping no  VB no  LOF no   Denies dysuria. Generally feels well today. Good self-care activities identified. Denies headaches, swelling, visual changes, or epigastric pain .     O) Blood pressure 108/64, weight 64.4 kg (142 lb).        Labs:        PNL: WNL       GCT:122       AFP: Not Examined       GBS: negative       Pertinent ultrasound - 18- Survey WNL, LORRIE 17.17cm, c/w prev dating.           A) IUP at 38w3d       S=D         Patient Active Problem List    Diagnosis Date Noted   • Supervision of normal first pregnancy 2018          SVE: 1.5/70/-2         TDAP: yes       FLU: yes        BTL: no       : no       C/S Consent: no       IOL or C/S scheduled: yes - referral placed today       LAST PAP: 18 negative         P) Labour precautions discussed.   Fetal movements reviewed. General ed and anticipatory guidance. Nutrition/exercise/vitamin. Plans breast Plans pp contraception- unsure    Discussed IOL policy after 41 weeks.  Encouraged walking 30m/day.  RTC 1 week or PRN.

## 2018-12-03 ENCOUNTER — TELEPHONE (OUTPATIENT)
Dept: OBGYN | Facility: CLINIC | Age: 23
End: 2018-12-03

## 2018-12-03 NOTE — TELEPHONE ENCOUNTER
Pt walked into the clinic stating she is 39 weeks and is having contractions more often through out the day and would like to start her maternity leave a week early. Per consult with Kallie Hayes, a letter was signed and given to pt informing her work that pt start maternity leave today.

## 2018-12-06 ENCOUNTER — ROUTINE PRENATAL (OUTPATIENT)
Dept: OBGYN | Facility: CLINIC | Age: 23
End: 2018-12-06
Payer: MEDICAID

## 2018-12-06 VITALS — DIASTOLIC BLOOD PRESSURE: 78 MMHG | BODY MASS INDEX: 31.38 KG/M2 | WEIGHT: 145 LBS | SYSTOLIC BLOOD PRESSURE: 100 MMHG

## 2018-12-06 DIAGNOSIS — Z34.00 SUPERVISION OF NORMAL FIRST PREGNANCY, ANTEPARTUM: Primary | ICD-10-CM

## 2018-12-06 DIAGNOSIS — O36.8390 BRADYCARDIA FOUND ON MEASUREMENT OF BASELINE FETAL HEART RATE: ICD-10-CM

## 2018-12-06 PROCEDURE — 90040 PR PRENATAL FOLLOW UP: CPT | Performed by: NURSE PRACTITIONER

## 2018-12-06 NOTE — PROGRESS NOTES
S) Pt is a 23 y.o.   at 39w3d  gestation. Routine prenatal care today. Pt only c/o pressure and irregular ctx.    Fetal movement Normal  Cramping no  VB no  LOF no   Denies dysuria. Generally feels well today. Good self-care activities identified. Denies headaches, swelling, visual changes, or epigastric pain .     O) Blood pressure 100/78, weight 65.8 kg (145 lb).        Labs:       PNL: WNL       GCT:122       AFP: Not Examined       GBS: negative       Pertinent ultrasound - 18- Survey WNL, LORRIE 17.17cm, c/w prev dating.       Low baseline on auscultation with doppler between 102-108    A) IUP at 39w3d       S=D         Patient Active Problem List    Diagnosis Date Noted   • Supervision of normal first pregnancy 2018          SVE: 1.5/80/-2         TDAP: yes       FLU: yes        BTL: no       : no       C/S Consent: no       IOL or C/S scheduled: yes - no date yet       LAST PAP: 18 negative         P) Labour precautions discussed.   Fetal movements reviewed. General ed and anticipatory guidance. Nutrition/exercise/vitamin. Plans breast Plans pp contraception- unsure   NST now for baseline 105 BPM.    RTC 1 week or PRN

## 2018-12-06 NOTE — PROGRESS NOTES
Pt here today for OB follow up  Pt states pressure,back pain, UC's  Reports +FM  Good # [phone]  Pharmacy Confirmed.  Chaperone offered and declines chaperone.

## 2018-12-07 RX ORDER — ONDANSETRON 4 MG/1
4 TABLET, ORALLY DISINTEGRATING ORAL EVERY 6 HOURS PRN
Qty: 20 TAB | Refills: 1 | Status: ON HOLD | OUTPATIENT
Start: 2018-12-07 | End: 2018-12-15

## 2018-12-13 ENCOUNTER — ROUTINE PRENATAL (OUTPATIENT)
Dept: OBGYN | Facility: CLINIC | Age: 23
End: 2018-12-13
Payer: MEDICAID

## 2018-12-13 VITALS — DIASTOLIC BLOOD PRESSURE: 70 MMHG | SYSTOLIC BLOOD PRESSURE: 110 MMHG | WEIGHT: 147 LBS | BODY MASS INDEX: 31.81 KG/M2

## 2018-12-13 DIAGNOSIS — Z34.03 SUPERVISION OF NORMAL FIRST PREGNANCY IN THIRD TRIMESTER: Primary | ICD-10-CM

## 2018-12-13 PROCEDURE — 90040 PR PRENATAL FOLLOW UP: CPT | Performed by: NURSE PRACTITIONER

## 2018-12-13 NOTE — PROGRESS NOTES
SUBJECTIVE:  Pt is a 23 y.o.   at 40w3d  gestation. Presents today for follow-up prenatal care. Reports no issues at this time.  Induction of labor has not yet been scheduled. Reports good  fetal movement. Denies cramping/contractions, bleeding or leaking of fluid. Denies dysuria, headaches, N/V, or other issues at this time. Generally feels well today.     OBJECTIVE:  - See prenatal vitals flow  -   Vitals:    18 1305   BP: 110/70   Weight: 66.7 kg (147 lb)      Labs - normal prenatal panel, normal glucose  US - normal fetal survey            ASSESSMENT:   - IUP at 40w3d    - S=D   -   Patient Active Problem List    Diagnosis Date Noted   • Supervision of normal first pregnancy 2018         PLAN:  - S/sx pregnancy and labor warning signs vs general discomforts discussed  - Fetal movements and kick counts reviewed   - Adequate hydration reinforced  - Nutrition/exercise/vitamin education; continued PNV  Order placed for induction of labor to be scheduled.

## 2018-12-13 NOTE — PROGRESS NOTES
OB f/u. + fetal movement.  No VB, LOF or UC's.  Wt:147       BP:110/70  Good phone # [phone]  Preferred pharmacy confirmed.

## 2018-12-15 ENCOUNTER — APPOINTMENT (OUTPATIENT)
Dept: OBGYN | Facility: MEDICAL CENTER | Age: 23
End: 2018-12-15
Attending: OBSTETRICS & GYNECOLOGY
Payer: MEDICAID

## 2018-12-15 ENCOUNTER — HOSPITAL ENCOUNTER (INPATIENT)
Facility: MEDICAL CENTER | Age: 23
LOS: 4 days | End: 2018-12-19
Attending: OBSTETRICS & GYNECOLOGY | Admitting: OBSTETRICS & GYNECOLOGY
Payer: MEDICAID

## 2018-12-15 DIAGNOSIS — G89.18 POST-OP PAIN: ICD-10-CM

## 2018-12-15 LAB
BASOPHILS # BLD AUTO: 0.7 % (ref 0–1.8)
BASOPHILS # BLD: 0.11 K/UL (ref 0–0.12)
EOSINOPHIL # BLD AUTO: 0.24 K/UL (ref 0–0.51)
EOSINOPHIL NFR BLD: 1.5 % (ref 0–6.9)
ERYTHROCYTE [DISTWIDTH] IN BLOOD BY AUTOMATED COUNT: 45.8 FL (ref 35.9–50)
HCT VFR BLD AUTO: 40.4 % (ref 37–47)
HGB BLD-MCNC: 13.6 G/DL (ref 12–16)
HOLDING TUBE BB 8507: NORMAL
IMM GRANULOCYTES # BLD AUTO: 0.09 K/UL (ref 0–0.11)
IMM GRANULOCYTES NFR BLD AUTO: 0.6 % (ref 0–0.9)
LYMPHOCYTES # BLD AUTO: 2.77 K/UL (ref 1–4.8)
LYMPHOCYTES NFR BLD: 17.6 % (ref 22–41)
MCH RBC QN AUTO: 30.8 PG (ref 27–33)
MCHC RBC AUTO-ENTMCNC: 33.7 G/DL (ref 33.6–35)
MCV RBC AUTO: 91.4 FL (ref 81.4–97.8)
MONOCYTES # BLD AUTO: 1.1 K/UL (ref 0–0.85)
MONOCYTES NFR BLD AUTO: 7 % (ref 0–13.4)
NEUTROPHILS # BLD AUTO: 11.41 K/UL (ref 2–7.15)
NEUTROPHILS NFR BLD: 72.6 % (ref 44–72)
NRBC # BLD AUTO: 0 K/UL
NRBC BLD-RTO: 0 /100 WBC
PLATELET # BLD AUTO: 298 K/UL (ref 164–446)
PMV BLD AUTO: 12 FL (ref 9–12.9)
RBC # BLD AUTO: 4.42 M/UL (ref 4.2–5.4)
WBC # BLD AUTO: 15.7 K/UL (ref 4.8–10.8)

## 2018-12-15 PROCEDURE — 85025 COMPLETE CBC W/AUTO DIFF WBC: CPT

## 2018-12-15 PROCEDURE — A9270 NON-COVERED ITEM OR SERVICE: HCPCS | Performed by: OBSTETRICS & GYNECOLOGY

## 2018-12-15 PROCEDURE — 700102 HCHG RX REV CODE 250 W/ 637 OVERRIDE(OP): Performed by: OBSTETRICS & GYNECOLOGY

## 2018-12-15 PROCEDURE — 700111 HCHG RX REV CODE 636 W/ 250 OVERRIDE (IP)

## 2018-12-15 PROCEDURE — 700105 HCHG RX REV CODE 258: Performed by: OBSTETRICS & GYNECOLOGY

## 2018-12-15 PROCEDURE — 700105 HCHG RX REV CODE 258

## 2018-12-15 PROCEDURE — 36415 COLL VENOUS BLD VENIPUNCTURE: CPT

## 2018-12-15 PROCEDURE — 3E033VJ INTRODUCTION OF OTHER HORMONE INTO PERIPHERAL VEIN, PERCUTANEOUS APPROACH: ICD-10-PCS | Performed by: OBSTETRICS & GYNECOLOGY

## 2018-12-15 PROCEDURE — 700105 HCHG RX REV CODE 258: Performed by: NURSE PRACTITIONER

## 2018-12-15 PROCEDURE — 770002 HCHG ROOM/CARE - OB PRIVATE (112)

## 2018-12-15 RX ORDER — SODIUM CHLORIDE, SODIUM LACTATE, POTASSIUM CHLORIDE, CALCIUM CHLORIDE 600; 310; 30; 20 MG/100ML; MG/100ML; MG/100ML; MG/100ML
INJECTION, SOLUTION INTRAVENOUS
Status: COMPLETED
Start: 2018-12-15 | End: 2018-12-15

## 2018-12-15 RX ORDER — ROPIVACAINE HYDROCHLORIDE 2 MG/ML
INJECTION, SOLUTION EPIDURAL; INFILTRATION; PERINEURAL
Status: COMPLETED
Start: 2018-12-15 | End: 2018-12-15

## 2018-12-15 RX ORDER — SODIUM CHLORIDE, SODIUM LACTATE, POTASSIUM CHLORIDE, CALCIUM CHLORIDE 600; 310; 30; 20 MG/100ML; MG/100ML; MG/100ML; MG/100ML
1000 INJECTION, SOLUTION INTRAVENOUS CONTINUOUS
Status: DISCONTINUED | OUTPATIENT
Start: 2018-12-15 | End: 2018-12-17

## 2018-12-15 RX ORDER — CARBOPROST TROMETHAMINE 250 UG/ML
250 INJECTION, SOLUTION INTRAMUSCULAR
Status: DISCONTINUED | OUTPATIENT
Start: 2018-12-15 | End: 2018-12-16 | Stop reason: HOSPADM

## 2018-12-15 RX ORDER — ONDANSETRON 2 MG/ML
4 INJECTION INTRAMUSCULAR; INTRAVENOUS EVERY 6 HOURS PRN
Status: DISCONTINUED | OUTPATIENT
Start: 2018-12-15 | End: 2018-12-16

## 2018-12-15 RX ORDER — METHYLERGONOVINE MALEATE 0.2 MG/ML
0.2 INJECTION INTRAVENOUS
Status: DISCONTINUED | OUTPATIENT
Start: 2018-12-15 | End: 2018-12-16 | Stop reason: HOSPADM

## 2018-12-15 RX ORDER — SODIUM CHLORIDE, SODIUM LACTATE, POTASSIUM CHLORIDE, CALCIUM CHLORIDE 600; 310; 30; 20 MG/100ML; MG/100ML; MG/100ML; MG/100ML
INJECTION, SOLUTION INTRAVENOUS CONTINUOUS
Status: DISPENSED | OUTPATIENT
Start: 2018-12-15 | End: 2018-12-15

## 2018-12-15 RX ORDER — TERBUTALINE SULFATE 1 MG/ML
0.25 INJECTION, SOLUTION SUBCUTANEOUS PRN
Status: DISCONTINUED | OUTPATIENT
Start: 2018-12-15 | End: 2018-12-16 | Stop reason: HOSPADM

## 2018-12-15 RX ORDER — MISOPROSTOL 200 UG/1
800 TABLET ORAL
Status: DISCONTINUED | OUTPATIENT
Start: 2018-12-15 | End: 2018-12-16 | Stop reason: HOSPADM

## 2018-12-15 RX ORDER — ALUMINA, MAGNESIA, AND SIMETHICONE 2400; 2400; 240 MG/30ML; MG/30ML; MG/30ML
30 SUSPENSION ORAL EVERY 6 HOURS PRN
Status: DISCONTINUED | OUTPATIENT
Start: 2018-12-15 | End: 2018-12-16 | Stop reason: HOSPADM

## 2018-12-15 RX ORDER — ACETAMINOPHEN 325 MG/1
650 TABLET ORAL ONCE
Status: COMPLETED | OUTPATIENT
Start: 2018-12-15 | End: 2018-12-15

## 2018-12-15 RX ORDER — ONDANSETRON 2 MG/ML
INJECTION INTRAMUSCULAR; INTRAVENOUS
Status: COMPLETED
Start: 2018-12-15 | End: 2018-12-15

## 2018-12-15 RX ADMIN — Medication 2 MILLI-UNITS/MIN: at 09:10

## 2018-12-15 RX ADMIN — ONDANSETRON 4 MG: 2 INJECTION INTRAMUSCULAR; INTRAVENOUS at 16:10

## 2018-12-15 RX ADMIN — SODIUM CHLORIDE, POTASSIUM CHLORIDE, SODIUM LACTATE AND CALCIUM CHLORIDE: 600; 310; 30; 20 INJECTION, SOLUTION INTRAVENOUS at 14:52

## 2018-12-15 RX ADMIN — SODIUM CHLORIDE, POTASSIUM CHLORIDE, SODIUM LACTATE AND CALCIUM CHLORIDE 1000 ML: 600; 310; 30; 20 INJECTION, SOLUTION INTRAVENOUS at 19:38

## 2018-12-15 RX ADMIN — SODIUM CHLORIDE, POTASSIUM CHLORIDE, SODIUM LACTATE AND CALCIUM CHLORIDE: 600; 310; 30; 20 INJECTION, SOLUTION INTRAVENOUS at 14:40

## 2018-12-15 RX ADMIN — ROPIVACAINE HYDROCHLORIDE 100 ML: 2 INJECTION, SOLUTION EPIDURAL; INFILTRATION at 15:38

## 2018-12-15 RX ADMIN — SODIUM CHLORIDE, POTASSIUM CHLORIDE, SODIUM LACTATE AND CALCIUM CHLORIDE 125 ML: 600; 310; 30; 20 INJECTION, SOLUTION INTRAVENOUS at 09:10

## 2018-12-15 RX ADMIN — ACETAMINOPHEN 650 MG: 325 TABLET, FILM COATED ORAL at 17:31

## 2018-12-15 RX ADMIN — SODIUM CHLORIDE, POTASSIUM CHLORIDE, SODIUM LACTATE AND CALCIUM CHLORIDE: 600; 310; 30; 20 INJECTION, SOLUTION INTRAVENOUS at 15:44

## 2018-12-15 ASSESSMENT — PATIENT HEALTH QUESTIONNAIRE - PHQ9
1. LITTLE INTEREST OR PLEASURE IN DOING THINGS: NOT AT ALL
SUM OF ALL RESPONSES TO PHQ9 QUESTIONS 1 AND 2: 0
2. FEELING DOWN, DEPRESSED, IRRITABLE, OR HOPELESS: NOT AT ALL
1. LITTLE INTEREST OR PLEASURE IN DOING THINGS: NOT AT ALL
2. FEELING DOWN, DEPRESSED, IRRITABLE, OR HOPELESS: NOT AT ALL
SUM OF ALL RESPONSES TO PHQ9 QUESTIONS 1 AND 2: 0

## 2018-12-15 ASSESSMENT — LIFESTYLE VARIABLES
ALCOHOL_USE: NO
EVER_SMOKED: YES

## 2018-12-15 NOTE — PROGRESS NOTES
[facility] LABOR AND DELIVERY PROGRESS NOTE    PATIENT ID:  NAME:  Sylwia Downing  MRN:               [id number]  YOB: 1995     23 y.o. female  at 40w5d.    Subjective: Had a gush of fluid when getting up to the bathroom    positive  For CTXS.   positive and negative Feels pain   positive for LOF  negative for vaginal bleeding.   positive for fetal movement    Objective:    Vitals:    12/15/18 1100 12/15/18 1200 12/15/18 1226 12/15/18 1300   BP: 108/53 115/58 110/58 117/54   Pulse: 73 86 74 75   Weight:       Height:           Cervix:  4cm/70%/-2  [place]: Uterine Contractions Q4 minutes.   FHRM: Baseline 125, Accels present, no decels, moderate variability  Pitocin: 4miliU/min  Pain control: none yet      Assessment: 23 y.o. female  at 40w5d here for IOL for post date - on pitocin making cervical change. No fluid pooling, or fluid expelled with cough , membrane likely still intact, but will monitor for pooling.    Plan:   1. Continue current management  2. Increase pitocin as indicated  3. Anticipate  delivery

## 2018-12-15 NOTE — CARE PLAN
Problem: Infection  Goal: Will remain free from infection    Intervention: Assess signs and symptoms of infection  Pt remains afebrile, no other s/s of infection noted at this time, will continue to monitor.      Problem: Knowledge Deficit  Goal: Knowledge of disease process/condition, treatment plan, diagnostic tests, and medications will improve    Intervention: Explain information regarding disease process/condition, treatment plan, diagnostic tests, and medications and document in education  Pt and family educated on labor process, what to expect, what is available to her/them. Pt and family also oriented to room. POC continuously updated.

## 2018-12-15 NOTE — PROGRESS NOTES
0740: pt arrived to L&D for schedules IOL for Post Dates, EDC 12/10=40.5 weeks gestation,. EFM/TOCO placed. VSS. Pt has allergies to Vicodin and Adhesive Tape- pt states that she has taken Percocet previously with no reaction, Acetaminophen is also ok per pt with no reaction.  0830: Dr Santana at bedside, SVE: 3/70/-2, Ok to start pitocin 2x2 every 30min  0905: IV started, labs drawn, LR running at 125ml/hr, pit started (see MAR).  1320: RN at bedside, pt c/o leaking of fluid and blood when she woke up and went to BR, RN did not notice any pooling or LOF in bed, Dr Santana called, SVE /-2- did not see any leaking, will continue to assess.  1440: Pt requesting epidural, fluid bolus initiated.  1537: Dr FIDELINA Pretty at bedside for epidural placement.  1615: Dr Santana at bedside, SVE//-2, AROM- 1616 with light meconium noted, IUPC placed, pt feeling contractions, bolus button pushed.  1648: RN at bedside, pt still feeling contraction pains, increased ropivucaine to 8ml/hr from 5 ml/hr- OK per Dr FIDELINA Pretty, verified by KAYLI Robin RN.  1720: Pt HA not resolving from earlier in the day, remains asymptomatic other than MORRISON, Dr Santana OK for 650 tylenol.  1855: Report given to EDWARD Gorman RN. POC discussed, pt having LD pit decreased (See MAR)

## 2018-12-15 NOTE — H&P
History and Physical    Sylwia Downing is a 23 y.o. female  -Para:     Gestational Age:  40w5d  Admitted for:   Induction of Labor  Admitted to  [facility].  [facility]    HPI: Patient is admitted with the above mentioned Chief Complaint and States   Loss of fluid:   negative  Abdominal Pain:  negative  Uterine Contractions:  positive  Vaginal Bleeding:  negative  Fetal Movement:  normal  Patient denies fever, chills, nausea, vomiting , headache, visual disturbance, or dysuria  No LMP recorded. Patient is pregnant.  Estimated Date of Delivery: 12/10/18  Final DANITZA: 12/10/2018, by Ultrasound    Patient Active Problem List    Diagnosis Date Noted   • Supervision of normal first pregnancy 2018       Admitting DX:   Pregnancy @ 40W5D  IOL for post EDC  Pregnancy Complications:  none  OB Risk Factors:   none  Labor State:    Not in labor.    History:   has a past medical history of Peptic ulcer. She also has no past medical history of Addisons disease ([facility]); Adrenal disorder ([facility]); Allergy; Anemia; Anxiety; Arrhythmia; Arthritis; Asthma; Blood transfusion without reported diagnosis; Cancer (HCC); Cataract; CHF (congestive heart failure) (HCC); Clotting disorder (HCC); COPD (chronic obstructive pulmonary disease) (HCC); Cushings syndrome (HCC); Depression; Diabetes (HCC); Diabetic neuropathy (HCC); GERD (gastroesophageal reflux disease); Glaucoma; Goiter; Head ache; Heart attack (HCC); Heart murmur; HIV (human immunodeficiency virus infection) ([facility]); Hyperlipidemia; Hypertension; IBD (inflammatory bowel disease); Kidney disease; Meningitis; Migraine; Muscle disorder; Osteoporosis; Parathyroid disorder (HCC); Pituitary disease (HCC); Pulmonary emphysema (HCC); Seizure (HCC); Sickle cell disease (HCC); Stroke (HCC); Substance abuse (HCC); Thyroid disease; Tuberculosis; or Urinary tract infection.     has a past surgical history that includes  "tonsillectomy and dental extraction(s).    OB History    Para Term  AB Living   1             SAB TAB Ectopic Molar Multiple Live Births                    # Outcome Date GA Lbr Juan/2nd Weight Sex Delivery Anes PTL Lv   1 Current                   Medications:  No current facility-administered medications on file prior to encounter.      Current Outpatient Prescriptions on File Prior to Encounter   Medication Sig Dispense Refill   • ondansetron (ZOFRAN ODT) 4 MG TABLET DISPERSIBLE TAKE 1 TAB BY MOUTH EVERY 6 HOURS AS NEEDED FOR NAUSEA. 20 Tab 1   • Prenatal Vit-Fe Fumarate-FA (PRENATAL VITAMIN) 27-0.8 MG Tab Take 1 Tab by mouth every day. 30 Tab 6   • Prenatal MV-Min-Fe Fum-FA-DHA (PRENATAL 1 PO) Take  by mouth.     • Norgestim-Eth Estrad Triphasic (ORTHO TRI-CYCLEN, 28, PO) Take 1 Tab by mouth every day.         Allergies:  Tape and Vicodin [hydrocodone-acetaminophen]    ROS:   Neuro: negative    Cardiovascular: negative  Gastro intestinal: negative  Genitourinary: negative            Physical Exam:  /63   Pulse 78   Ht 1.448 m (4' 9\")   Wt 66.7 kg (147 lb)   BMI 31.81 kg/m²   Constitutional: healthy-appearing  No JVD: while supine  HEENT: PERRLA  Breast Exam: negative  Cardio: regular rate and rhythm, S1, S2 normal, no murmur, click, rub or gallop  Lung: clear to auscultation without rales or wheezes  Abdomen: abdomen is soft, gravid  without  Tenderness,  Extremity: extremities, peripheral pulses and reflexes normal    Cervical Exam: 70%  Cervix Dilatation: 3  Station: negative 2  Pelvis: Normal  Fetal Assessment: Fetal heart variability: moderate, baseline 115-120. [place]: irregular contractions and uterine irritability present. Category 1.  Estimated Fetal Weight: 3000 - 3500g      Labs:      Prenatal Results     1st Trimester     Test Value Date Time    Hgb       Hct       ABO O  18 1225    Rh POS  18 1225    Antibody screen NEG  18 1225    Gonorrhea       Chlamydia    "    HSV 1       HSV 2       HPV       HBsAg Negative  06/26/18 1214    HIV-1 and HIV-2 Antibodies       RPR       Rubella       TB       Pap smear       Urinalysis       Urine Drug Screen       Urine culture       HbA1c       Fasting Glucose Tolerance       GTT, 1 hour       GTT, 2 hours       GTT, 3 hours             2nd Trimester     Test Value Date Time    Hgb 12.9 g/dL 08/31/18 1122    Hct 38.4 % 08/31/18 1122    Urinalysis       Urine Culture       AST       ALT       Uric Acid       Fasting Glucose Tolerance       GTT, 1 hour       GTT, 2 hours       GTT, 3 hours             3rd Trimester     Test Value Date Time    Hgb       Hct       Platelet count 302 K/uL 06/26/18 1214    GBS Negative  11/15/18 1435    GBS (discrete) Negative  11/15/18 1435    Urinalysis       Urine Culture       Urine Drug Screen             Congenital Disease Screening     Test Value Date Time    First Trimester Screen       Quad Screen       Sickle Cell       Thalassemia       [facility]       Cystic Fibrosis Carrier Study                     Assessment:  Gestational Age: 40w5d  Admission for IOL due to post EDC  Favorable cervical exam  Category 1 FHR tracing  GBS neg    Patient Active Problem List    Diagnosis Date Noted   • Supervision of normal first pregnancy 05/16/2018       Plan:   Admitted for: Induction of Labor  Pitocin IOL discussed  Plan of care reviewed    Lukasz Santana M.D.

## 2018-12-16 LAB
ERYTHROCYTE [DISTWIDTH] IN BLOOD BY AUTOMATED COUNT: 46.9 FL (ref 35.9–50)
HCT VFR BLD AUTO: 37.6 % (ref 37–47)
HGB BLD-MCNC: 12.4 G/DL (ref 12–16)
MCH RBC QN AUTO: 30.1 PG (ref 27–33)
MCHC RBC AUTO-ENTMCNC: 33 G/DL (ref 33.6–35)
MCV RBC AUTO: 91.3 FL (ref 81.4–97.8)
PLATELET # BLD AUTO: 275 K/UL (ref 164–446)
PMV BLD AUTO: 11.9 FL (ref 9–12.9)
RBC # BLD AUTO: 4.12 M/UL (ref 4.2–5.4)
WBC # BLD AUTO: 31.8 K/UL (ref 4.8–10.8)

## 2018-12-16 PROCEDURE — 770002 HCHG ROOM/CARE - OB PRIVATE (112)

## 2018-12-16 PROCEDURE — A9270 NON-COVERED ITEM OR SERVICE: HCPCS | Performed by: ANESTHESIOLOGY

## 2018-12-16 PROCEDURE — 700105 HCHG RX REV CODE 258: Performed by: OBSTETRICS & GYNECOLOGY

## 2018-12-16 PROCEDURE — 700111 HCHG RX REV CODE 636 W/ 250 OVERRIDE (IP): Performed by: OBSTETRICS & GYNECOLOGY

## 2018-12-16 PROCEDURE — 303615 HCHG EPIDURAL/SPINAL ANESTHESIA FOR LABOR

## 2018-12-16 PROCEDURE — 700101 HCHG RX REV CODE 250

## 2018-12-16 PROCEDURE — 700111 HCHG RX REV CODE 636 W/ 250 OVERRIDE (IP)

## 2018-12-16 PROCEDURE — 700102 HCHG RX REV CODE 250 W/ 637 OVERRIDE(OP): Performed by: OBSTETRICS & GYNECOLOGY

## 2018-12-16 PROCEDURE — 304964 HCHG RECOVERY ROOM TIME 1HR: Performed by: OBSTETRICS & GYNECOLOGY

## 2018-12-16 PROCEDURE — 59514 CESAREAN DELIVERY ONLY: CPT | Mod: 80

## 2018-12-16 PROCEDURE — A9270 NON-COVERED ITEM OR SERVICE: HCPCS | Performed by: OBSTETRICS & GYNECOLOGY

## 2018-12-16 PROCEDURE — 59510 CESAREAN DELIVERY: CPT | Performed by: OBSTETRICS & GYNECOLOGY

## 2018-12-16 PROCEDURE — 700105 HCHG RX REV CODE 258

## 2018-12-16 PROCEDURE — 304966 HCHG RECOVERY SVSC TIME ADDL 1/2 HR: Performed by: OBSTETRICS & GYNECOLOGY

## 2018-12-16 PROCEDURE — 700102 HCHG RX REV CODE 250 W/ 637 OVERRIDE(OP): Performed by: ANESTHESIOLOGY

## 2018-12-16 PROCEDURE — 306828 HCHG ANES-TIME GENERAL: Performed by: OBSTETRICS & GYNECOLOGY

## 2018-12-16 PROCEDURE — 59514 CESAREAN DELIVERY ONLY: CPT

## 2018-12-16 PROCEDURE — 700105 HCHG RX REV CODE 258: Performed by: NURSE PRACTITIONER

## 2018-12-16 PROCEDURE — 85027 COMPLETE CBC AUTOMATED: CPT

## 2018-12-16 PROCEDURE — 700111 HCHG RX REV CODE 636 W/ 250 OVERRIDE (IP): Performed by: ANESTHESIOLOGY

## 2018-12-16 PROCEDURE — 305385 HCHG SURGICAL SERVICES 1/4 HOUR: Performed by: OBSTETRICS & GYNECOLOGY

## 2018-12-16 PROCEDURE — 36415 COLL VENOUS BLD VENIPUNCTURE: CPT

## 2018-12-16 RX ORDER — METOCLOPRAMIDE HYDROCHLORIDE 5 MG/ML
10 INJECTION INTRAMUSCULAR; INTRAVENOUS ONCE
Status: COMPLETED | OUTPATIENT
Start: 2018-12-16 | End: 2018-12-16

## 2018-12-16 RX ORDER — DIPHENHYDRAMINE HYDROCHLORIDE 50 MG/ML
12.5 INJECTION INTRAMUSCULAR; INTRAVENOUS EVERY 6 HOURS PRN
Status: DISCONTINUED | OUTPATIENT
Start: 2018-12-16 | End: 2018-12-17

## 2018-12-16 RX ORDER — SODIUM CHLORIDE, SODIUM GLUCONATE, SODIUM ACETATE, POTASSIUM CHLORIDE AND MAGNESIUM CHLORIDE 526; 502; 368; 37; 30 MG/100ML; MG/100ML; MG/100ML; MG/100ML; MG/100ML
INJECTION, SOLUTION INTRAVENOUS
Status: COMPLETED
Start: 2018-12-16 | End: 2018-12-16

## 2018-12-16 RX ORDER — OXYCODONE HYDROCHLORIDE 5 MG/1
5 TABLET ORAL EVERY 4 HOURS PRN
Status: DISCONTINUED | OUTPATIENT
Start: 2018-12-16 | End: 2018-12-17

## 2018-12-16 RX ORDER — OXYCODONE HCL 5 MG/5 ML
5 SOLUTION, ORAL ORAL
Status: DISCONTINUED | OUTPATIENT
Start: 2018-12-16 | End: 2018-12-16 | Stop reason: HOSPADM

## 2018-12-16 RX ORDER — SODIUM CHLORIDE, SODIUM LACTATE, POTASSIUM CHLORIDE, CALCIUM CHLORIDE 600; 310; 30; 20 MG/100ML; MG/100ML; MG/100ML; MG/100ML
1000 INJECTION, SOLUTION INTRAVENOUS CONTINUOUS
Status: DISCONTINUED | OUTPATIENT
Start: 2018-12-16 | End: 2018-12-17

## 2018-12-16 RX ORDER — CITRIC ACID/SODIUM CITRATE 334-500MG
30 SOLUTION, ORAL ORAL ONCE
Status: COMPLETED | OUTPATIENT
Start: 2018-12-16 | End: 2018-12-16

## 2018-12-16 RX ORDER — TERBUTALINE SULFATE 1 MG/ML
INJECTION, SOLUTION SUBCUTANEOUS
Status: COMPLETED
Start: 2018-12-16 | End: 2018-12-16

## 2018-12-16 RX ORDER — ROPIVACAINE HYDROCHLORIDE 2 MG/ML
INJECTION, SOLUTION EPIDURAL; INFILTRATION; PERINEURAL
Status: COMPLETED
Start: 2018-12-16 | End: 2018-12-16

## 2018-12-16 RX ORDER — DIPHENHYDRAMINE HYDROCHLORIDE 50 MG/ML
25 INJECTION INTRAMUSCULAR; INTRAVENOUS EVERY 6 HOURS PRN
Status: DISCONTINUED | OUTPATIENT
Start: 2018-12-16 | End: 2018-12-17

## 2018-12-16 RX ORDER — SIMETHICONE 80 MG
80 TABLET,CHEWABLE ORAL 4 TIMES DAILY PRN
Status: DISCONTINUED | OUTPATIENT
Start: 2018-12-16 | End: 2018-12-19 | Stop reason: HOSPADM

## 2018-12-16 RX ORDER — ONDANSETRON 2 MG/ML
4 INJECTION INTRAMUSCULAR; INTRAVENOUS
Status: DISCONTINUED | OUTPATIENT
Start: 2018-12-16 | End: 2018-12-16 | Stop reason: HOSPADM

## 2018-12-16 RX ORDER — ACETAMINOPHEN 500 MG
1000 TABLET ORAL EVERY 6 HOURS
Status: DISCONTINUED | OUTPATIENT
Start: 2018-12-16 | End: 2018-12-17

## 2018-12-16 RX ORDER — MEPERIDINE HYDROCHLORIDE 25 MG/ML
6.25 INJECTION INTRAMUSCULAR; INTRAVENOUS; SUBCUTANEOUS
Status: DISCONTINUED | OUTPATIENT
Start: 2018-12-16 | End: 2018-12-16 | Stop reason: HOSPADM

## 2018-12-16 RX ORDER — SODIUM CHLORIDE, SODIUM LACTATE, POTASSIUM CHLORIDE, CALCIUM CHLORIDE 600; 310; 30; 20 MG/100ML; MG/100ML; MG/100ML; MG/100ML
INJECTION, SOLUTION INTRAVENOUS PRN
Status: DISCONTINUED | OUTPATIENT
Start: 2018-12-16 | End: 2018-12-19 | Stop reason: HOSPADM

## 2018-12-16 RX ORDER — DIPHENHYDRAMINE HYDROCHLORIDE 50 MG/ML
12.5 INJECTION INTRAMUSCULAR; INTRAVENOUS
Status: DISCONTINUED | OUTPATIENT
Start: 2018-12-16 | End: 2018-12-16 | Stop reason: HOSPADM

## 2018-12-16 RX ORDER — KETOROLAC TROMETHAMINE 30 MG/ML
30 INJECTION, SOLUTION INTRAMUSCULAR; INTRAVENOUS EVERY 6 HOURS
Status: DISCONTINUED | OUTPATIENT
Start: 2018-12-16 | End: 2018-12-17

## 2018-12-16 RX ORDER — SODIUM CHLORIDE, SODIUM GLUCONATE, SODIUM ACETATE, POTASSIUM CHLORIDE AND MAGNESIUM CHLORIDE 526; 502; 368; 37; 30 MG/100ML; MG/100ML; MG/100ML; MG/100ML; MG/100ML
1500 INJECTION, SOLUTION INTRAVENOUS ONCE
Status: DISCONTINUED | OUTPATIENT
Start: 2018-12-16 | End: 2018-12-16 | Stop reason: HOSPADM

## 2018-12-16 RX ORDER — VITAMIN A ACETATE, BETA CAROTENE, ASCORBIC ACID, CHOLECALCIFEROL, .ALPHA.-TOCOPHEROL ACETATE, DL-, THIAMINE MONONITRATE, RIBOFLAVIN, NIACINAMIDE, PYRIDOXINE HYDROCHLORIDE, FOLIC ACID, CYANOCOBALAMIN, CALCIUM CARBONATE, FERROUS FUMARATE, ZINC OXIDE, CUPRIC OXIDE 3080; 12; 120; 400; 1; 1.84; 3; 20; 22; 920; 25; 200; 27; 10; 2 [IU]/1; UG/1; MG/1; [IU]/1; MG/1; MG/1; MG/1; MG/1; MG/1; [IU]/1; MG/1; MG/1; MG/1; MG/1; MG/1
1 TABLET, FILM COATED ORAL EVERY MORNING
Status: DISCONTINUED | OUTPATIENT
Start: 2018-12-16 | End: 2018-12-19 | Stop reason: HOSPADM

## 2018-12-16 RX ORDER — ONDANSETRON 2 MG/ML
4 INJECTION INTRAMUSCULAR; INTRAVENOUS EVERY 6 HOURS PRN
Status: DISCONTINUED | OUTPATIENT
Start: 2018-12-16 | End: 2018-12-17

## 2018-12-16 RX ORDER — HALOPERIDOL 5 MG/ML
1 INJECTION INTRAMUSCULAR
Status: DISCONTINUED | OUTPATIENT
Start: 2018-12-16 | End: 2018-12-16 | Stop reason: HOSPADM

## 2018-12-16 RX ORDER — BISACODYL 10 MG
10 SUPPOSITORY, RECTAL RECTAL PRN
Status: DISCONTINUED | OUTPATIENT
Start: 2018-12-16 | End: 2018-12-19 | Stop reason: HOSPADM

## 2018-12-16 RX ORDER — OXYCODONE HCL 5 MG/5 ML
10 SOLUTION, ORAL ORAL
Status: DISCONTINUED | OUTPATIENT
Start: 2018-12-16 | End: 2018-12-16 | Stop reason: HOSPADM

## 2018-12-16 RX ORDER — OXYCODONE HYDROCHLORIDE 10 MG/1
10 TABLET ORAL EVERY 4 HOURS PRN
Status: DISCONTINUED | OUTPATIENT
Start: 2018-12-16 | End: 2018-12-17

## 2018-12-16 RX ORDER — DOCUSATE SODIUM 100 MG/1
100 CAPSULE, LIQUID FILLED ORAL 2 TIMES DAILY PRN
Status: DISCONTINUED | OUTPATIENT
Start: 2018-12-16 | End: 2018-12-19 | Stop reason: HOSPADM

## 2018-12-16 RX ADMIN — Medication 125 ML/HR: at 12:19

## 2018-12-16 RX ADMIN — KETOROLAC TROMETHAMINE 30 MG: 30 INJECTION, SOLUTION INTRAMUSCULAR at 23:45

## 2018-12-16 RX ADMIN — TERBUTALINE SULFATE 0.25 MG: 1 INJECTION, SOLUTION SUBCUTANEOUS at 08:18

## 2018-12-16 RX ADMIN — SODIUM CITRATE AND CITRIC ACID MONOHYDRATE 30 ML: 500; 334 SOLUTION ORAL at 10:24

## 2018-12-16 RX ADMIN — SODIUM CHLORIDE, SODIUM GLUCONATE, SODIUM ACETATE, POTASSIUM CHLORIDE AND MAGNESIUM CHLORIDE 1500 ML: 526; 502; 368; 37; 30 INJECTION, SOLUTION INTRAVENOUS at 09:19

## 2018-12-16 RX ADMIN — METOCLOPRAMIDE 10 MG: 5 INJECTION, SOLUTION INTRAMUSCULAR; INTRAVENOUS at 10:24

## 2018-12-16 RX ADMIN — FENTANYL CITRATE 100 MCG: 50 INJECTION, SOLUTION INTRAMUSCULAR; INTRAVENOUS at 04:55

## 2018-12-16 RX ADMIN — FAMOTIDINE 20 MG: 10 INJECTION INTRAVENOUS at 10:24

## 2018-12-16 RX ADMIN — KETOROLAC TROMETHAMINE 30 MG: 30 INJECTION, SOLUTION INTRAMUSCULAR at 18:15

## 2018-12-16 RX ADMIN — ACETAMINOPHEN 1000 MG: 500 TABLET ORAL at 16:19

## 2018-12-16 RX ADMIN — ACETAMINOPHEN 1000 MG: 500 TABLET ORAL at 21:56

## 2018-12-16 RX ADMIN — FENTANYL CITRATE 100 MCG: 50 INJECTION, SOLUTION INTRAMUSCULAR; INTRAVENOUS at 03:43

## 2018-12-16 RX ADMIN — Medication 1 TABLET: at 16:19

## 2018-12-16 RX ADMIN — OXYCODONE HYDROCHLORIDE 5 MG: 5 TABLET ORAL at 18:15

## 2018-12-16 RX ADMIN — SODIUM CHLORIDE, POTASSIUM CHLORIDE, SODIUM LACTATE AND CALCIUM CHLORIDE 1000 ML: 600; 310; 30; 20 INJECTION, SOLUTION INTRAVENOUS at 00:30

## 2018-12-16 RX ADMIN — ONDANSETRON 4 MG: 2 INJECTION INTRAMUSCULAR; INTRAVENOUS at 10:14

## 2018-12-16 RX ADMIN — SODIUM CHLORIDE, POTASSIUM CHLORIDE, SODIUM LACTATE AND CALCIUM CHLORIDE 1000 ML: 600; 310; 30; 20 INJECTION, SOLUTION INTRAVENOUS at 08:12

## 2018-12-16 RX ADMIN — ONDANSETRON 4 MG: 2 INJECTION INTRAMUSCULAR; INTRAVENOUS at 01:22

## 2018-12-16 RX ADMIN — AZITHROMYCIN FOR INJECTION INJECTION, POWDER, LYOPHILIZED, FOR SOLUTION 500 MG: 500 INJECTION INTRAVENOUS at 12:32

## 2018-12-16 RX ADMIN — ROPIVACAINE HYDROCHLORIDE 100 ML: 2 INJECTION, SOLUTION EPIDURAL; INFILTRATION at 02:59

## 2018-12-16 ASSESSMENT — PAIN SCALES - GENERAL
PAINLEVEL_OUTOF10: 0
PAINLEVEL_OUTOF10: 4
PAINLEVEL_OUTOF10: 0
PAINLEVEL_OUTOF10: 4
PAINLEVEL_OUTOF10: 0
PAINLEVEL_OUTOF10: 5

## 2018-12-16 ASSESSMENT — PATIENT HEALTH QUESTIONNAIRE - PHQ9
SUM OF ALL RESPONSES TO PHQ9 QUESTIONS 1 AND 2: 0
2. FEELING DOWN, DEPRESSED, IRRITABLE, OR HOPELESS: NOT AT ALL
1. LITTLE INTEREST OR PLEASURE IN DOING THINGS: NOT AT ALL

## 2018-12-16 NOTE — OP REPORT
Operative Report   Date of service: 12/16/18    PreOp Diagnosis:   1. SIUP @ 40w6d  2. Arrest of descent  3. Persistent OP position    PostOp Diagnosis:   S/p primary LTCS    Procedure(s):  PRIMARY C SECTION - Wound Class: Clean Contaminated    Surgeon(s):  July Almaraz M.D.  Assistant: Silvia Islas MD    Anesthesiologist/Type of Anesthesia:  Anesthesiologist: Daljit Nino M.D. / failed epidural, GETA    Surgical Staff:  Circulator: Jessie Merritt R.N.  Scrub Person: Jonathon Galeana  L&D Baby  Nurse: Rubia Agee R.N.    Indication: Pt admitted for IOL at 40w5d for post dates and progressed to 10/100/+1.  She then had no further progress despite >7hrs in the 2nd stage and good maternal effort.  Attempt had been made during her pushing course to manually rotate the fetus to OA position, however the fetus immediately rotated back after several attempts.  Decision was made to proceed with primary LTCS for arrest of descent.  In the OR, the pt had had a functioning epidural however after bolus by anesthesia, the pt was prepped and draped and additional 10 minutes was given to take full effect.  The epidural anesthesia was not found to be adequate so the decision was made to proceed under general anesthesia.      Specimens removed if any:  Umbilical cord gases sent    Estimated Blood Loss: 1000cc  UOP: 100cc  LR: 1300cc    Findings: vigorous male infant, ROP position, APGARs 8/9, wt 3250g  Normal uterus, tubes, and ovaries    Complications: none      Procedure in Detail:  The pt ws taken to the operating room where the patient was prepped and draped in a normal supine position with a wedge under the right hip.  Epidural anesthesia was not found to be adequate as above and the decision was made to proceed with general anesthesia .  A pfannensteil incision was made and taken down to the fascia with the scalpel, which was incised bilaterally with the scalpel.  The fascial incision was  extended bilaterally with blunt dissection. There rectus muscles were  manually and the peritoneum was entered bluntly and the peritoneal incision was extended with stretching.  The bladder blade was placed.  The lower uterine segment was incised transversely and the endometrial cavity entered with a blunt finged.  The hysterotomy was extended with cephalad-caudad stretching.  The infant's head was manually elevated to the level of the hysterotomy and delivered, followed atraumatically by the anterior shoulder, posterior shoulder and body with help of fundal pressure.  The cord was clamped x2, cut and the infant handed to waiting respiratory team.  Cord gases were sent.  The placenta delivered with gentle cord traction and uterine massage and was noted to be intact with 3-vessel cord. The uterus was exteriorized, cleared of all clots and debris and closed in 2 layers with 0-monocryl.  Additional figures of 8 were placed over midline and just medial to the right angle.  The uterus was returned to the abdomen, the gutters cleared of all clots and debris and the hysterotomy re-examined and noted to be hemostatic.  The rectus muscles were inspected, found to be hemostatic.  The fascia was closed with 0 Vicryl.  The subcutaneous tissues was irrigated. Bleeders were coagulated with the bovie.  The subcutaneous tissues were approximated with running 2-0 plain and the skin with subcuticular suture with 4-0 monocryl.  Dermabond was placed, let dry and followed by tegarderm.  All counts were correct x3.  Pt and infant went to recovery in good condition.       July Almaraz MD  [facility], Women's Health

## 2018-12-16 NOTE — CARE PLAN
Problem: Pain  Goal: Alleviation of Pain or a reduction in pain to the patient's comfort goal  Outcome: PROGRESSING AS EXPECTED  Pt has epidural-will continue to monitor. Will apply interventions as needed.    Problem: Risk for Infection, Impaired Wound Healing  Goal: Remain free from signs and symptoms of infection  Outcome: PROGRESSING AS EXPECTED  VSS- will continue to monitor. Will limit SVE to decrease risk of infection

## 2018-12-16 NOTE — PROGRESS NOTES
Patient arrived in room 316 from L&D.  Report received from Jessie PANG.  Oriented patient on safety information, infant identification, and skylight.

## 2018-12-16 NOTE — PROGRESS NOTES
0700- Report received from Vandana LEON RN. POC discussed. Assumed care. Pt in stirrups pushing at this time. Coached on proper pushing technique.   0810- Dr. Almaraz at bedside. Pt having late deceleration at this time. Pt turned to far left position, bolused with LR, charge RN phoned for additional assistance. Pt educated on risks/ benefits of possible c/s. Pt understands.   0902- Pt verbalizing no longer wanting to push. Educated on options. Pt verbalizing to proceed with c/s.   1000- Dr. Almaraz at bedside discussing c/s with pt. Pt verbalizing wanting to proceed with procedure. Consents signed. Anesthesia notified over pt's increasing pain. Bolused. Pt prepped for surgery with all pre-op procedures and medications.   1046- Pt in OR 1.   1110- Delivery of viable male infant. APGARS 8/9. See delivery record.   1104- Pt in PACU.. Report received from anesthesia. PACU orders received. Will continue to monitor pt.   1400- Transferred to PPU via gurney with infant in arms. Report given to Stephanie PANG. POC discuss. Bands verified. Questions answered.

## 2018-12-16 NOTE — PROGRESS NOTES
"Obstetrics & Gynecology Labor Progress Note    Date of Service  12/15/2018    Subjective  23 y.o. female admitted 12/15/2018 for IOL due to post EDC.    Pt comfortable with epidural. Currently on Pitocin IOL. Has no complaints    Objective  /57   Pulse 64   Ht 1.448 m (4' 9\")   Wt 66.7 kg (147 lb)   SpO2 98%   BMI 31.81 kg/m²   Sterile Vaginal Exam: Dilation: 4 Effacement (%): 80 Fetal Station: -2  Fetal Heart Tracing: Baseline Rate: 115 bpm Variability: 6-25 Accelerations: Present Decels: Absent Mode: External US    [place]: Mode: External [place] Contraction Frequency: 2 - 5     Amniotomy discussed and performed. Moderate amount of fluid obtained with light mec    Assessment  23 y.o.  at 40w5d here with Pregnancy  IOL for post EDC with pitocin  Labor and delivery, indication for care  S/P amniotomy-mec  Category 1 FHR tracing  S/P epidural     Plan  continue present management  Plan of care discussed    Lukasz Santana M.D.        "

## 2018-12-16 NOTE — PROGRESS NOTES
Brief Progress Note    Accepting care at 8AM; came to assess pt in prolonged second stage.  Per report, pt complete at 0200 but with significant pain control issues and has not been pushing for very long and has been pushing well.    Pt reports feeling well.      SVE;10/100/+1, ROP  FHTs /110s-120s/mod misael/variables with pushing, occasional late decels, no accels  [place]: q3min    Attempted to rotate fetus to ANAY, successful however then settled back to ROP.  Moderate caput.  Prolonged decel to 80-90s, subQ terbutaline 0.25 given and pt turned to side with recovery of FHTs.    Discussed w/ pt concerning how long her 2nd stage has been that she is not closer to delivery.  Not low enough to pull.  Discussed risk of increased Difficult to know if she is making progress since this is my first examination of her.  Offered c/s now vs continued pushing for a short period of time.  Pt desires to push.  If within 60 minutes she has not made significant progress, will proceed with primary LTCS for arrest of descent.    July Almaraz MD  [facility]      1000 Addendum:  Pt without progress, fetal head has not descended past +1 station.  Will proceed with primary c/s for arrest of descent.  I discussed w/ pt risks of surgery including pain, bleeding, infection, risk of damage to intraabdominal structures including bowel/bladder/ureters.  Pt confirms she is accepting of blood transfusion in case of emergency.  Reviewed risks of transfusion including transfusion reaction (fever, damage to heart/lungs/kidneys), risk of exposure to infectious disease including HIV and hepatitis.  All questions answered.  Consent signed.    July Almaraz MD  [facility]

## 2018-12-16 NOTE — PROGRESS NOTES
S: Assessment at 1930: Pt is comfortable with epidural.  In to introduce myself to patient. Pitocin currently off due to late decels at change of shift.    O:    Vitals:    12/15/18 1704 12/15/18 1724 12/15/18 1800 12/15/18 1900   BP: 117/56 115/54 116/57 116/56   Pulse: 76 75 78 72   Temp:       TempSrc:       SpO2:       Weight:       Height:               FHTs:  Baseline 115, + accels, no decels at present, moderate variability        [place]: Contractions q 3-5 minutes, mod/firm to palpation, pitocin off        SVE: 4/100/0     A/P:  1.  IUP @ 40w5d            2.  Cat 2 FHTs             3.  Restart pitocin             4.  Will reassess in 3 hours or as indicated    Madelin Alcantara CNM, APRN

## 2018-12-16 NOTE — OR SURGEON
Immediate Post OP Note    PreOp Diagnosis:   1. SIUP @ 40w6d  2. Arrest of descent  3. Persistent OP position    PostOp Diagnosis:   S/p primary LTCS    Procedure(s):  PRIMARY C SECTION - Wound Class: Clean Contaminated    Surgeon(s):  July Almaraz M.D.  Assistant: Silvia Islas MD    Anesthesiologist/Type of Anesthesia:  Anesthesiologist: Daljit Nino M.D. / failed epidural, GETA    Surgical Staff:  Circulator: Jessie Merritt R.N.  Scrub Person: Jonathon MOORE&AARON Baby  Nurse: Rubia Agee R.N.    Specimens removed if any:  Umbilical cord gases sent    Estimated Blood Loss: 1000cc  UOP: 100cc  LR: 1300cc    Findings: vigorous male infant, ROP position, APGARs 8/9, wt 3250g  Normal uterus, tubes, and ovaries    Complications: none        12/16/2018 12:29 PM July Almaraz M.D.

## 2018-12-16 NOTE — PROGRESS NOTES
1900 bedside report from RNAMELIA. Pt alert, resting comfortably in bed. LD noted on monitor. Pit turned off, LR opened wide for fluid bolus, and pt repositioned to opposite side. Peanut ball removed. Pt denies needs ta this time. Will continue to monitor.    1930 GAVIN Alcantara CNM at bedside. SVE 4/100/0. Pt updated. Will continue to monitor. Will restart pitocin at 2mu/mu and increase per orders.    2120 pt reports pain on right side. Rolled on to right side. Bolus button pressed. Will contiue to monitor.    2155 pt calls out. States she feels lightheaded and clammy. Pt repositioned. BP taken-WDL. Bloody show noted on pad- pain in LLQ. Pt rolled on to left side. Will reassess in 15 min.    2115 Pt reports consistent pain. SVE no change. Bolus button pressed. Will reassess in 15.    2130 RN at bedside. Less pain. Pt wants to try and sleep. Will notify RN of needs/concerns.    2255 RN at bedside. Wakes up pt-suggests reposition for best epidural function and due to LD on monitor. Pt understands. Rolled to right side. Pt promptly falls back to sleep. MD notified. Will continue to monitor.    2326 CNM at bedside. Discussion of potential fr c/s if no cervical change, due to fetal intolerance to labor and FTP. SVE 7-8/100/+1. Will continue to labor and monitor.    2331 RN at bedside. Pit turned off. LR bolus started. Pt repositioned.     2339 pt reports pressure. SVE 8-9/100/+1.    0000 RN at bedside. Pt sleeping. FOB at bedside.    0030 pt calls out. Reports pain/pressure on left side of pelvis. Pt requests SVE to see if she is dilated more. RN discusses risk of infection w/ pt. Pt understands- continues to request SVE.     0035 SVE ant lip/+1. Pt placed in throne position w/ right wedge. Will continue to monitor. Pt reports pain improvement.    0115 pt calls out for nausea. Requests meds. Carin dmin per orders.    0120 RN at bedside. meds administered. Pt repositioned due to discomfort in LLQ    0137 CNM at  bedside. SVE C/+1. Will labor down in throne position for 30-60 minutes then start pushing. Pt understands.    0215 pt calls out. Pt experiencing pain in pelvic area and pressure in bottom- would like to start pushing.    0225 pt reports feeling better with pushing. Will continue.    0245 pt becomes fatigued. Pt begins screaming/crying stating 'I can't do this. I won't do this anymore' and flailing arms,refusing to push. Family at bedside for assistance, supporting RN and saying pt has to calm down and needs to breathe.    0320 pt refusing to push. States 'It hurts too much. Make it stop (repeatedly), I can't do it'. RN leaves bedside to notify CNM.    0330 Madelin STEELE at bedside. Discuss importance of powerful, intentional pushing. Pt crying and rigid with cxns. Pt agrees to do a set of pushes for CNM- after pushes CNM states pt is not pushing enough. There is plenty of room in pelvis. Pt states she needs something for pain. CNM agrees to get pt pain meds from anesthesia but she needs to be on peanut ball and rotate while meds are working. Pt agrees.    0340 Maria De Jesus quinn MD notified. MD states a new epidural nor a bolus will help the pocket pt feels in lower right quadrant or the pain she is feeling in her back due to OP position. Suggests admin 100mcg of fentanyl and increase ropivicaine dose.     0445 RN at bedside. Pt requests more fentanyl.    0450 CNM notified of pt request. CNM agrees w/ additional admin of med to help pt relax and hopefully allow head to come down and turn in pelvis. Will continue to monitor.    0530 RN at bedside. Pt rolled to other side wishes to continue resting.    0600 RN at bedside. Pt reports decreased back pain and improved pain management- is ready to start pushing again.    0700 bedside report to RNDennise. Pt pushing currently- family at bedside.POC discussed- all questions answered. VSS.

## 2018-12-17 PROCEDURE — 700102 HCHG RX REV CODE 250 W/ 637 OVERRIDE(OP): Performed by: FAMILY MEDICINE

## 2018-12-17 PROCEDURE — 700102 HCHG RX REV CODE 250 W/ 637 OVERRIDE(OP): Performed by: ANESTHESIOLOGY

## 2018-12-17 PROCEDURE — A9270 NON-COVERED ITEM OR SERVICE: HCPCS | Performed by: OBSTETRICS & GYNECOLOGY

## 2018-12-17 PROCEDURE — A9270 NON-COVERED ITEM OR SERVICE: HCPCS | Performed by: FAMILY MEDICINE

## 2018-12-17 PROCEDURE — A9270 NON-COVERED ITEM OR SERVICE: HCPCS | Performed by: ANESTHESIOLOGY

## 2018-12-17 PROCEDURE — 700111 HCHG RX REV CODE 636 W/ 250 OVERRIDE (IP): Performed by: ANESTHESIOLOGY

## 2018-12-17 PROCEDURE — 700102 HCHG RX REV CODE 250 W/ 637 OVERRIDE(OP): Performed by: OBSTETRICS & GYNECOLOGY

## 2018-12-17 PROCEDURE — 770002 HCHG ROOM/CARE - OB PRIVATE (112)

## 2018-12-17 PROCEDURE — 700112 HCHG RX REV CODE 229: Performed by: OBSTETRICS & GYNECOLOGY

## 2018-12-17 RX ORDER — IBUPROFEN 600 MG/1
600 TABLET ORAL EVERY 6 HOURS PRN
Status: DISCONTINUED | OUTPATIENT
Start: 2018-12-17 | End: 2018-12-17

## 2018-12-17 RX ORDER — SODIUM CHLORIDE, SODIUM GLUCONATE, SODIUM ACETATE, POTASSIUM CHLORIDE AND MAGNESIUM CHLORIDE 526; 502; 368; 37; 30 MG/100ML; MG/100ML; MG/100ML; MG/100ML; MG/100ML
1500 INJECTION, SOLUTION INTRAVENOUS ONCE
Status: DISCONTINUED | OUTPATIENT
Start: 2018-12-17 | End: 2018-12-17

## 2018-12-17 RX ORDER — ACETAMINOPHEN 500 MG
500 TABLET ORAL EVERY 6 HOURS PRN
Status: DISCONTINUED | OUTPATIENT
Start: 2018-12-17 | End: 2018-12-19 | Stop reason: HOSPADM

## 2018-12-17 RX ORDER — OXYCODONE HYDROCHLORIDE 5 MG/1
5 TABLET ORAL EVERY 4 HOURS PRN
Status: DISCONTINUED | OUTPATIENT
Start: 2018-12-17 | End: 2018-12-19 | Stop reason: HOSPADM

## 2018-12-17 RX ORDER — OXYCODONE HYDROCHLORIDE 10 MG/1
10 TABLET ORAL EVERY 4 HOURS PRN
Status: DISCONTINUED | OUTPATIENT
Start: 2018-12-17 | End: 2018-12-19 | Stop reason: HOSPADM

## 2018-12-17 RX ORDER — METOCLOPRAMIDE HYDROCHLORIDE 5 MG/ML
10 INJECTION INTRAMUSCULAR; INTRAVENOUS ONCE
Status: DISCONTINUED | OUTPATIENT
Start: 2018-12-17 | End: 2018-12-17

## 2018-12-17 RX ORDER — CITRIC ACID/SODIUM CITRATE 334-500MG
30 SOLUTION, ORAL ORAL ONCE
Status: DISCONTINUED | OUTPATIENT
Start: 2018-12-17 | End: 2018-12-17

## 2018-12-17 RX ORDER — IBUPROFEN 600 MG/1
600 TABLET ORAL EVERY 6 HOURS PRN
Status: DISCONTINUED | OUTPATIENT
Start: 2018-12-17 | End: 2018-12-19 | Stop reason: HOSPADM

## 2018-12-17 RX ORDER — DIPHENHYDRAMINE HCL 25 MG
25 TABLET ORAL EVERY 6 HOURS PRN
Status: DISCONTINUED | OUTPATIENT
Start: 2018-12-17 | End: 2018-12-19 | Stop reason: HOSPADM

## 2018-12-17 RX ORDER — DIPHENHYDRAMINE HYDROCHLORIDE 50 MG/ML
12.5 INJECTION INTRAMUSCULAR; INTRAVENOUS EVERY 6 HOURS PRN
Status: CANCELLED | OUTPATIENT
Start: 2018-12-16 | End: 2018-12-17

## 2018-12-17 RX ORDER — ACETAMINOPHEN 325 MG/1
325 TABLET ORAL EVERY 4 HOURS PRN
Status: DISCONTINUED | OUTPATIENT
Start: 2018-12-17 | End: 2018-12-17

## 2018-12-17 RX ORDER — OXYCODONE HYDROCHLORIDE 10 MG/1
10 TABLET ORAL EVERY 4 HOURS PRN
Status: CANCELLED | OUTPATIENT
Start: 2018-12-16 | End: 2018-12-17

## 2018-12-17 RX ORDER — KETOROLAC TROMETHAMINE 30 MG/ML
30 INJECTION, SOLUTION INTRAMUSCULAR; INTRAVENOUS EVERY 6 HOURS
Status: CANCELLED | OUTPATIENT
Start: 2018-12-16 | End: 2018-12-17

## 2018-12-17 RX ORDER — KETOROLAC TROMETHAMINE 30 MG/ML
30 INJECTION, SOLUTION INTRAMUSCULAR; INTRAVENOUS EVERY 6 HOURS
Status: DISCONTINUED | OUTPATIENT
Start: 2018-12-17 | End: 2018-12-17

## 2018-12-17 RX ORDER — ONDANSETRON 2 MG/ML
4 INJECTION INTRAMUSCULAR; INTRAVENOUS EVERY 6 HOURS PRN
Status: CANCELLED | OUTPATIENT
Start: 2018-12-16 | End: 2018-12-17

## 2018-12-17 RX ORDER — OXYCODONE HYDROCHLORIDE 5 MG/1
5 TABLET ORAL EVERY 4 HOURS PRN
Status: CANCELLED | OUTPATIENT
Start: 2018-12-16 | End: 2018-12-17

## 2018-12-17 RX ORDER — OXYCODONE HYDROCHLORIDE AND ACETAMINOPHEN 5; 325 MG/1; MG/1
2 TABLET ORAL EVERY 4 HOURS PRN
Status: DISCONTINUED | OUTPATIENT
Start: 2018-12-17 | End: 2018-12-17

## 2018-12-17 RX ORDER — ACETAMINOPHEN 500 MG
1000 TABLET ORAL EVERY 6 HOURS
Status: CANCELLED | OUTPATIENT
Start: 2018-12-16 | End: 2018-12-17

## 2018-12-17 RX ORDER — OXYCODONE HYDROCHLORIDE AND ACETAMINOPHEN 5; 325 MG/1; MG/1
1 TABLET ORAL EVERY 4 HOURS PRN
Status: DISCONTINUED | OUTPATIENT
Start: 2018-12-17 | End: 2018-12-17

## 2018-12-17 RX ORDER — DIPHENHYDRAMINE HYDROCHLORIDE 50 MG/ML
25 INJECTION INTRAMUSCULAR; INTRAVENOUS EVERY 6 HOURS PRN
Status: CANCELLED | OUTPATIENT
Start: 2018-12-16 | End: 2018-12-17

## 2018-12-17 RX ADMIN — SIMETHICONE CHEW TAB 80 MG 80 MG: 80 TABLET ORAL at 04:18

## 2018-12-17 RX ADMIN — IBUPROFEN 600 MG: 600 TABLET, FILM COATED ORAL at 12:10

## 2018-12-17 RX ADMIN — OXYCODONE HYDROCHLORIDE 10 MG: 10 TABLET ORAL at 08:39

## 2018-12-17 RX ADMIN — ACETAMINOPHEN 500 MG: 500 TABLET ORAL at 17:35

## 2018-12-17 RX ADMIN — OXYCODONE HYDROCHLORIDE 10 MG: 10 TABLET ORAL at 21:39

## 2018-12-17 RX ADMIN — ACETAMINOPHEN 1000 MG: 500 TABLET ORAL at 04:02

## 2018-12-17 RX ADMIN — OXYCODONE HYDROCHLORIDE 10 MG: 10 TABLET ORAL at 13:30

## 2018-12-17 RX ADMIN — DOCUSATE SODIUM 100 MG: 100 CAPSULE, LIQUID FILLED ORAL at 17:35

## 2018-12-17 RX ADMIN — KETOROLAC TROMETHAMINE 30 MG: 30 INJECTION, SOLUTION INTRAMUSCULAR at 05:55

## 2018-12-17 RX ADMIN — IBUPROFEN 600 MG: 600 TABLET, FILM COATED ORAL at 17:35

## 2018-12-17 RX ADMIN — DOCUSATE SODIUM 100 MG: 100 CAPSULE, LIQUID FILLED ORAL at 04:18

## 2018-12-17 RX ADMIN — OXYCODONE HYDROCHLORIDE 10 MG: 10 TABLET ORAL at 17:35

## 2018-12-17 RX ADMIN — OXYCODONE HYDROCHLORIDE 10 MG: 10 TABLET ORAL at 04:17

## 2018-12-17 RX ADMIN — IBUPROFEN 600 MG: 600 TABLET, FILM COATED ORAL at 23:42

## 2018-12-17 ASSESSMENT — PAIN SCALES - GENERAL
PAINLEVEL_OUTOF10: 7
PAINLEVEL_OUTOF10: 2
PAINLEVEL_OUTOF10: 0
PAINLEVEL_OUTOF10: 6
PAINLEVEL_OUTOF10: 7
PAINLEVEL_OUTOF10: 7
PAINLEVEL_OUTOF10: 4
PAINLEVEL_OUTOF10: 4
PAINLEVEL_OUTOF10: 8
PAINLEVEL_OUTOF10: 7
PAINLEVEL_OUTOF10: 3

## 2018-12-17 ASSESSMENT — EDINBURGH POSTNATAL DEPRESSION SCALE (EPDS)
I HAVE FELT SCARED OR PANICKY FOR NO GOOD REASON: NO, NOT MUCH
I HAVE BEEN ANXIOUS OR WORRIED FOR NO GOOD REASON: YES, SOMETIMES
I HAVE BEEN SO UNHAPPY THAT I HAVE HAD DIFFICULTY SLEEPING: NOT VERY OFTEN
I HAVE FELT SAD OR MISERABLE: NO, NOT AT ALL
THINGS HAVE BEEN GETTING ON TOP OF ME: NO, MOST OF THE TIME I HAVE COPED QUITE WELL
I HAVE BEEN ABLE TO LAUGH AND SEE THE FUNNY SIDE OF THINGS: AS MUCH AS I ALWAYS COULD
I HAVE BLAMED MYSELF UNNECESSARILY WHEN THINGS WENT WRONG: NOT VERY OFTEN
THE THOUGHT OF HARMING MYSELF HAS OCCURRED TO ME: NEVER
I HAVE LOOKED FORWARD WITH ENJOYMENT TO THINGS: AS MUCH AS I EVER DID
I HAVE BEEN SO UNHAPPY THAT I HAVE BEEN CRYING: NO, NEVER

## 2018-12-17 NOTE — DISCHARGE PLANNING
"Discharge Planning Assessment Post Partum    Reason for Referral: History of THC 6-8 weeks ago.    Address: [address] [place] [address]. [place] [address]  Phone: [phone]  Type of Living Situation: living with FOB  Mom Diagnosis: Pregnancy  Baby Diagnosis: [place]  Primary Language: English    Name of Baby: Medardo Osullivan (: 18)  Father of the Baby: Corazon Osullivan (: 89)  Involved in baby’s care? Yes  Contact Information: [phone]  Employed at [employer]    Prenatal Care: Yes  Mom's PCP: None  [facility]    Support System: FO  Coping/Bonding between mother & baby: Yes  Source of Feeding: breast  Supplies for Infant: prepared for infant, denies any needs    Mom's Insurance: Medicaid  Baby Covered on Insurance:Yes  Mother Employed/School:  at [employer], currently on FMLA  Other children in the home/names & ages: 1st baby    Financial Hardship/Income: denies   Mom's Mental status: alert and oriented  Services used prior to admit: Medicaid, food stamps, and WIC    CPS History: Report made on  to Vandana Stein.  Vandana stated the report is information only and asked that an [employer] referral is made.  Referral faxed in.  Psychiatric History: denies  Domestic Violence History: denies  Drug/ETOH History: Yes, admits to using marijuana a couple of months ago to help with nausea.  Stated she only used it \"a couple of times\" during the pregnancy.  Infant's UDS positive for THC.    Resources Provided: children and family resource list, counseling resource for post partum depression  Referrals Made: diaper bank referral and an [employer] referral.     Clearance for Discharge: Report made to [facility] due to infant's positive UDS for marijuana.  Report is information only.  Infant is cleared to discharge home with parents.      "

## 2018-12-17 NOTE — PROGRESS NOTES
Report received from Annamarie PANG. Assumed care. Coached breast feeding. Encouraged to call if with need. Will check at intervals.

## 2018-12-17 NOTE — PROGRESS NOTES
2000 Received awake on bed with on going IV Pitocin in progress, with mccord catheter connected to urine bag draining to clear urine output, with sequential stocking bilaterally, Assessment done wound covered with trans parent film clean and dry, Denies pain at this time, Encourage mobilization, Needs attended.

## 2018-12-17 NOTE — LACTATION NOTE
This note was copied from a baby's chart.  Physical assessment of baby and mother provided. Introduction to basics of initiating breastfeeding shown at this time to include posture, angle of latch, hand expression, skin to skin and normal  feeding patterns and expectations.    Met with parents to review the [employer] parent education and breastfeeding support book. Encouraged skin to skin time and obtaining support from nursing staff.

## 2018-12-17 NOTE — PROGRESS NOTES
SECTION POSTPARTUM PROGRESS NOTE    PATIENT ID:  NAME:  Sylwia Downing  MRN:               [id number]  YOB: 1995     23 y.o. female  at 40w6d POD#1 s/p Primary LTCS for arrest of descent.      Subjective: No acute events overnight. Ambulating well. Lochia moderate and improving. Voiding, No BM yet.     Objective:    Vitals:    18 0100 18 0200 18 0300 18 0400   BP:    102/57   Pulse: 98 93 90 87   Resp:    Temp:    36.7 °C (98 °F)   TempSrc:    Temporal   SpO2: 95% 91% 95% 92%   Weight:       Height:         General: No acute distress, resting comfortably in bed.  HEENT: normocephalic, nontraumatic, PERRLA, EOMI  Cardiovascular: Heart RRR with no murmurs, rubs or gallops. Distal Pulses 2+  Respiratory: symmetric chest expansion, lungs CTA bilaterally with no wheezes rales or rhonci  Abdomen: soft, mildly tender around incision which is clean, dry and intact, fundus firm, +BS  Genitourinary: lochia light, denies excessive vaginal bleeding  Musculoskeletal: strength 5/5 in four extremities  Neuro: non focal with no numbness, tingling or changes in sensation      Recent Labs      12/15/18   0910  18   1931   WBC  15.7*  31.8*   RBC  4.42  4.12*   HEMOGLOBIN  13.6  12.4   HEMATOCRIT  40.4  37.6   MCV  91.4  91.3   MCH  30.8  30.1   RDW  45.8  46.9   PLATELETCT  298  275   MPV  12.0  11.9   NEUTSPOLYS  72.60*   --    LYMPHOCYTES  17.60*   --    MONOCYTES  7.00   --    EOSINOPHILS  1.50   --    BASOPHILS  0.70   --      No results for input(s): SODIUM, POTASSIUM, CHLORIDE, CO2, GLUCOSE, BUN, CPKTOTAL in the last 72 hours.    Current Meds:   Current Facility-Administered Medications   Medication Dose Frequency Provider Last Rate Last Dose   • lactated ringers infusion  1,000 mL Continuous Daljit Nino M.D.       • LR infusion   PRN July Almaraz M.D.       • docusate sodium (COLACE) capsule 100 mg  100 mg BID PRN July Almaraz M.D.    100 mg at 18 0418   • bisacodyl (DULCOLAX) suppository 10 mg  10 mg PRN July Almaraz M.D.       • magnesium hydroxide (MILK OF MAGNESIA) suspension 30 mL  30 mL Q6HRS PRN July Almaraz M.D.       • simethicone (MYLICON) chewable tab 80 mg  80 mg 4X/DAY PRN July Almaraz M.D.   80 mg at 18 0418   • prenatal plus vitamin (STUARTNATAL 1+1) 27-1 MG tablet 1 Tab  1 Tab QAM July Almaraz M.D.   1 Tab at 18 1619   • acetaminophen (TYLENOL) tablet 1,000 mg  1,000 mg Q6HR Daljit Nino M.D.   1,000 mg at 18 0402   • ketorolac (TORADOL) injection 30 mg  30 mg Q6HRS Daljit Nino M.D.   30 mg at 18 0555   • oxyCODONE immediate-release (ROXICODONE) tablet 5 mg  5 mg Q4HRS PRJAIRON Nino M.D.   5 mg at 18 1815   • oxyCODONE immediate release (ROXICODONE) tablet 10 mg  10 mg Q4HRS PRJAIRON Nino M.D.   10 mg at 18 0417   • ondansetron (ZOFRAN) syringe/vial injection 4 mg  4 mg Q6HRS PRJAIRON Nino M.D.       • diphenhydrAMINE (BENADRYL) injection 12.5 mg  12.5 mg Q6HRS PRJAIRON Nino M.D.       • diphenhydrAMINE (BENADRYL) injection 12.5 mg  12.5 mg Q6HRS PRJAIRON Nino M.D.        Or   • diphenhydrAMINE (BENADRYL) injection 25 mg  25 mg Q6HRS PRJAIRON Nino M.D.        Or   • naloxone (NARCAN) 0.4 mg in NS 1,000 mL infusion  0.4 mg PRJAIRON Nino M.D.       • oxytocin (PITOCIN) infusion (for induction)  0.5-20 candelario-units/min Continuous Lukasz Santana M.D. 125 mL/hr at 18 1219 125 mL/hr at 18 1219   • lactated ringers infusion  1,000 mL Continuous LATESHA Oliver 125 mL/hr at 18 0812 1,000 mL at 18 0812     Last reviewed on 2018 11:38 AM by Jessie Merritt R.N.        Assessment:  23 y.o. female  at 40w6d POD#1 s/p Primary LTCS for Arrest of descent     Plan:   1. Routine postpartum care  2. Disposition: Likely DC to home POD #3

## 2018-12-17 NOTE — PROGRESS NOTES
Assumed care of pt, received report from POLY Brown. Fundus firm at . [facility] to be continue. Will continue to monitor.

## 2018-12-18 PROCEDURE — A9270 NON-COVERED ITEM OR SERVICE: HCPCS | Performed by: OBSTETRICS & GYNECOLOGY

## 2018-12-18 PROCEDURE — 700102 HCHG RX REV CODE 250 W/ 637 OVERRIDE(OP): Performed by: OBSTETRICS & GYNECOLOGY

## 2018-12-18 PROCEDURE — 700112 HCHG RX REV CODE 229: Performed by: OBSTETRICS & GYNECOLOGY

## 2018-12-18 PROCEDURE — 770002 HCHG ROOM/CARE - OB PRIVATE (112)

## 2018-12-18 RX ADMIN — OXYCODONE HYDROCHLORIDE 10 MG: 10 TABLET ORAL at 23:56

## 2018-12-18 RX ADMIN — OXYCODONE HYDROCHLORIDE 10 MG: 10 TABLET ORAL at 01:38

## 2018-12-18 RX ADMIN — Medication 1 TABLET: at 05:50

## 2018-12-18 RX ADMIN — IBUPROFEN 600 MG: 600 TABLET, FILM COATED ORAL at 12:32

## 2018-12-18 RX ADMIN — OXYCODONE HYDROCHLORIDE 10 MG: 10 TABLET ORAL at 05:50

## 2018-12-18 RX ADMIN — OXYCODONE HYDROCHLORIDE 10 MG: 10 TABLET ORAL at 10:08

## 2018-12-18 RX ADMIN — IBUPROFEN 600 MG: 600 TABLET, FILM COATED ORAL at 05:50

## 2018-12-18 RX ADMIN — DOCUSATE SODIUM 100 MG: 100 CAPSULE, LIQUID FILLED ORAL at 19:20

## 2018-12-18 RX ADMIN — IBUPROFEN 600 MG: 600 TABLET, FILM COATED ORAL at 22:49

## 2018-12-18 RX ADMIN — DOCUSATE SODIUM 100 MG: 100 CAPSULE, LIQUID FILLED ORAL at 05:51

## 2018-12-18 RX ADMIN — OXYCODONE HYDROCHLORIDE 10 MG: 10 TABLET ORAL at 19:20

## 2018-12-18 RX ADMIN — OXYCODONE HYDROCHLORIDE 10 MG: 10 TABLET ORAL at 14:50

## 2018-12-18 ASSESSMENT — PAIN SCALES - GENERAL
PAINLEVEL_OUTOF10: 7
PAINLEVEL_OUTOF10: 4
PAINLEVEL_OUTOF10: 9
PAINLEVEL_OUTOF10: 7
PAINLEVEL_OUTOF10: 8
PAINLEVEL_OUTOF10: 3
PAINLEVEL_OUTOF10: 7
PAINLEVEL_OUTOF10: 3
PAINLEVEL_OUTOF10: 7
PAINLEVEL_OUTOF10: 3
PAINLEVEL_OUTOF10: 8
PAINLEVEL_OUTOF10: 2
PAINLEVEL_OUTOF10: 8

## 2018-12-18 ASSESSMENT — PATIENT HEALTH QUESTIONNAIRE - PHQ9
2. FEELING DOWN, DEPRESSED, IRRITABLE, OR HOPELESS: NOT AT ALL
SUM OF ALL RESPONSES TO PHQ9 QUESTIONS 1 AND 2: 0
1. LITTLE INTEREST OR PLEASURE IN DOING THINGS: NOT AT ALL

## 2018-12-18 NOTE — LACTATION NOTE
"This note was copied from a baby's chart.  Baby 40.5 weeks, . Mother reports baby has been latching well however, last night baby cluster fed and had some shallow latches, nipples are now bruised & cracked bilaterally. Turned on \"Latching on\" video for mother to watch. Reinforced importance of positioning & getting baby to open wide for deep latch. Breast massage demo done, able to express colostrum easily. Assisted baby to right & left breast using cross cradle hold, skin to skin, obtained deep latch bilaterally, mother denies pain with latch. Mother has NB booklet, reinforced TLC for outpatient lactation support, 1:1 consults & Breastfeeding [place].    Teaching on hunger cues, breastfeed when baby shows cues or by 3 hours from last feed, importance of skin to skin, positioning baby at breast, hand expression & cluster feeding.    Breastfeeding POC:  Breastfeed when baby shows cues or by 3 hours from last feed. F/U with TLC for outpatient lactation support.   "

## 2018-12-18 NOTE — PROGRESS NOTES
Post Partum Progress Note    Name:   Sylwia Downing   Date/Time:  2018 - 6:38 AM  Chief Admitting Dx:  Pregnancy  IOL  Labor and delivery, indication for care  Delivery Type:   for arrest of descent  Post-Op/Post Partum Days #:  2    Subjective:  Abdominal pain: no  Ambulating:   yes  Tolerating liquids:  yes  Tolerating food:  yes common adult  Flatus:   yes  BM:    no  Bleeding:   with a small amount of bleeding  Voiding:   yes  Dizziness:   no  Feeding:   breast    Vitals:    18 0300 18 0400 18 0800 18   BP:  102/57 104/64 114/77   Pulse: 90 87 83 60   Resp:    Temp:  36.7 °C (98 °F) 36.7 °C (98.1 °F) 36.5 °C (97.7 °F)   TempSrc:  Temporal Oral Temporal   SpO2: 95% 92% 95% 99%   Weight:       Height:           Exam:  Breast: Lactating yes  Abdomen: Abdomen soft, non-tender. BS normal. No masses,  No organomegaly  Fundal Tenderness:  no  Fundus Firm: yes  Incision: dry and intact  Below umbilicus: yes  Perineum: perineum intact  Lochia: mild  Extremities: 1+ edema extremities, peripheral pulses and reflexes normal, Homans sign is negative, no sign of DVT    Meds:  Current Facility-Administered Medications   Medication Dose   • ibuprofen (MOTRIN) tablet 600 mg  600 mg   • oxyCODONE immediate-release (ROXICODONE) tablet 5 mg  5 mg   • oxyCODONE immediate release (ROXICODONE) tablet 10 mg  10 mg   • diphenhydrAMINE (BENADRYL) tablet/capsule 25 mg  25 mg   • acetaminophen (TYLENOL) tablet 500 mg  500 mg   • LR infusion     • docusate sodium (COLACE) capsule 100 mg  100 mg   • bisacodyl (DULCOLAX) suppository 10 mg  10 mg   • magnesium hydroxide (MILK OF MAGNESIA) suspension 30 mL  30 mL   • simethicone (MYLICON) chewable tab 80 mg  80 mg   • prenatal plus vitamin (STUARTNATAL 1+1) 27-1 MG tablet 1 Tab  1 Tab       Labs:   Recent Labs      12/15/18   0910  18   1931   WBC  15.7*  31.8*   RBC  4.42  4.12*   HEMOGLOBIN  13.6  12.4   HEMATOCRIT  40.4  37.6    MCV  91.4  91.3   MCH  30.8  30.1   MCHC  33.7  33.0*   RDW  45.8  46.9   PLATELETCT  298  275   MPV  12.0  11.9       Assessment:  Chief Admitting Dx:  Pregnancy  IOL  Labor and delivery, indication for care  Delivery Type:   for arrest of descent  Tubal Ligation:  no    Plan:  Continue routine post partum care.  Anticipate discharge tomorrow.    Charmaine Blair M.D.

## 2018-12-18 NOTE — PROGRESS NOTES
Pt doing well bonding with baby, Assessment done wound open to air clean and dry, Pt complained of moderate abdominal pain medicated her as per doctro orders, Encourage more ambulation, Needs attended.

## 2018-12-19 VITALS
HEIGHT: 57 IN | WEIGHT: 147 LBS | OXYGEN SATURATION: 99 % | TEMPERATURE: 97 F | RESPIRATION RATE: 18 BRPM | HEART RATE: 90 BPM | SYSTOLIC BLOOD PRESSURE: 127 MMHG | BODY MASS INDEX: 31.71 KG/M2 | DIASTOLIC BLOOD PRESSURE: 82 MMHG

## 2018-12-19 PROCEDURE — 700112 HCHG RX REV CODE 229: Performed by: OBSTETRICS & GYNECOLOGY

## 2018-12-19 PROCEDURE — 700102 HCHG RX REV CODE 250 W/ 637 OVERRIDE(OP): Performed by: FAMILY MEDICINE

## 2018-12-19 PROCEDURE — A9270 NON-COVERED ITEM OR SERVICE: HCPCS | Performed by: OBSTETRICS & GYNECOLOGY

## 2018-12-19 PROCEDURE — A9270 NON-COVERED ITEM OR SERVICE: HCPCS | Performed by: FAMILY MEDICINE

## 2018-12-19 PROCEDURE — 700102 HCHG RX REV CODE 250 W/ 637 OVERRIDE(OP): Performed by: OBSTETRICS & GYNECOLOGY

## 2018-12-19 RX ORDER — IBUPROFEN 600 MG/1
600 TABLET ORAL EVERY 6 HOURS PRN
Qty: 30 TAB | Refills: 0 | Status: SHIPPED | OUTPATIENT
Start: 2018-12-19 | End: 2019-05-13

## 2018-12-19 RX ORDER — PSEUDOEPHEDRINE HCL 30 MG
100 TABLET ORAL 2 TIMES DAILY PRN
Qty: 60 CAP | Refills: 0 | Status: SHIPPED | OUTPATIENT
Start: 2018-12-19 | End: 2019-05-13

## 2018-12-19 RX ORDER — OXYCODONE HYDROCHLORIDE AND ACETAMINOPHEN 5; 325 MG/1; MG/1
1-2 TABLET ORAL EVERY 4 HOURS PRN
Qty: 30 TAB | Refills: 0 | Status: SHIPPED | OUTPATIENT
Start: 2018-12-19 | End: 2018-12-26

## 2018-12-19 RX ORDER — OXYCODONE HYDROCHLORIDE AND ACETAMINOPHEN 5; 325 MG/1; MG/1
1-2 TABLET ORAL EVERY 4 HOURS PRN
Qty: 30 TAB | Refills: 0 | Status: SHIPPED | OUTPATIENT
Start: 2018-12-19 | End: 2018-12-19

## 2018-12-19 RX ADMIN — OXYCODONE HYDROCHLORIDE 10 MG: 10 TABLET ORAL at 08:04

## 2018-12-19 RX ADMIN — IBUPROFEN 600 MG: 600 TABLET, FILM COATED ORAL at 06:12

## 2018-12-19 RX ADMIN — OXYCODONE HYDROCHLORIDE 5 MG: 5 TABLET ORAL at 20:35

## 2018-12-19 RX ADMIN — OXYCODONE HYDROCHLORIDE 10 MG: 10 TABLET ORAL at 12:54

## 2018-12-19 RX ADMIN — DOCUSATE SODIUM 100 MG: 100 CAPSULE, LIQUID FILLED ORAL at 12:53

## 2018-12-19 RX ADMIN — ACETAMINOPHEN 500 MG: 500 TABLET ORAL at 12:54

## 2018-12-19 RX ADMIN — OXYCODONE HYDROCHLORIDE 10 MG: 10 TABLET ORAL at 04:00

## 2018-12-19 RX ADMIN — IBUPROFEN 600 MG: 600 TABLET, FILM COATED ORAL at 12:53

## 2018-12-19 RX ADMIN — IBUPROFEN 600 MG: 600 TABLET, FILM COATED ORAL at 20:35

## 2018-12-19 RX ADMIN — OXYCODONE HYDROCHLORIDE 10 MG: 10 TABLET ORAL at 17:09

## 2018-12-19 RX ADMIN — Medication 1 TABLET: at 06:12

## 2018-12-19 ASSESSMENT — PAIN SCALES - GENERAL
PAINLEVEL_OUTOF10: 4
PAINLEVEL_OUTOF10: 4
PAINLEVEL_OUTOF10: 8
PAINLEVEL_OUTOF10: 3
PAINLEVEL_OUTOF10: 5
PAINLEVEL_OUTOF10: 8
PAINLEVEL_OUTOF10: 7
PAINLEVEL_OUTOF10: 3
PAINLEVEL_OUTOF10: 7

## 2018-12-19 NOTE — CARE PLAN
Problem: Altered physiologic condition related to postoperative  delivery  Goal: Patient physiologically stable as evidenced by normal lochia, palpable uterine involution and vital signs within normal limits  Outcome: PROGRESSING AS EXPECTED  Fundus firm, lochia light, vitals stable.     Problem: Potential for postpartum infection related to surgical incision, compromised uterine condition, urinary tract or respiratory compromise  Goal: Patient will be afebrile and free from signs and symptoms of infection  Outcome: PROGRESSING AS EXPECTED  Pt afebrile, laceration approximated, dressing clean/dry/intact. Education provided regarding incision care. No signs of infection at this time.

## 2018-12-19 NOTE — PROGRESS NOTES
1900: Bedside report received, assumed care of pt.     Assessment complete, VSS, fundus firm, lochia light. Incision approximated, transparent dressing clean/dry/intact. Pt voiding without difficulty. Bonding well with infant. Pt would like to receive pain medication on a scheduled basis.    Initiated breast pump due to infant's 9% weight loss. She pumped both breasts for 15 minutes with a speed of 80 down to 60 after 2 minutes, and a suction on 20%. 5 mL breastmilk pumped and fed to infant, also assisted her to latch infant to left breast and was able to obtain quality latch.     POC discussed with pt and family, questions answered, call light within reach.

## 2018-12-19 NOTE — PROGRESS NOTES
Discharge instruction for mom and baby discussed. Patient already got her prescription. Emphasized the importance of  screening follow-up test. Questions and concerns have been answered.

## 2018-12-19 NOTE — DISCHARGE SUMMARY
Discharge Summary:      Sylwia Downing      Admit Date:   12/15/2018  Discharge Date:  2018     Admitting diagnosis:  Pregnancy  IOL  Labor and delivery, indication for care  Discharge Diagnosis: Status post  for failure to descend .  Pregnancy Complications: marijuana use  Tubal Ligation:  no        History:  Past Medical History:   Diagnosis Date   • Peptic ulcer     at age 15 - 18 yrs old     OB History    Para Term  AB Living   1 1 1     1   SAB TAB Ectopic Molar Multiple Live Births           0 1      # Outcome Date GA Lbr Juan/2nd Weight Sex Delivery Anes PTL Lv   1 Term 18 40w6d / 09:33 3.25 kg (7 lb 2.6 oz) M CS-LTranv Gen N DEMETRIO      Complications: Failure to Progress in Second Stage           Strawberry; Tape; and Vicodin [hydrocodone-acetaminophen]  Patient Active Problem List    Diagnosis Date Noted   • Supervision of normal first pregnancy 2018        Hospital Course:   23 y.o. , now para 1, was admitted with the above mentioned diagnosis, underwent Induction of Labor,  for failure to descend. Patient postpartum course was unremarkable, with progressive advancement in diet , ambulation and toleration of oral analgesia. Patient without complaints today and desires discharge.      Vitals:    18 0800 18 0800 18   BP: 104/64 114/77 (!) 98/63 125/81   Pulse: 83 60 87 86   Resp:  18 16 20   Temp: 36.7 °C (98.1 °F) 36.5 °C (97.7 °F) (!) 35.6 °C (96 °F) 36.6 °C (97.9 °F)   TempSrc: Oral Temporal Temporal Temporal   SpO2: 95% 99% 94% 98%   Weight:       Height:           Current Facility-Administered Medications   Medication Dose   • ibuprofen (MOTRIN) tablet 600 mg  600 mg   • oxyCODONE immediate-release (ROXICODONE) tablet 5 mg  5 mg   • oxyCODONE immediate release (ROXICODONE) tablet 10 mg  10 mg   • diphenhydrAMINE (BENADRYL) tablet/capsule 25 mg  25 mg   • acetaminophen (TYLENOL) tablet 500 mg  500 mg   •  LR infusion     • docusate sodium (COLACE) capsule 100 mg  100 mg   • bisacodyl (DULCOLAX) suppository 10 mg  10 mg   • magnesium hydroxide (MILK OF MAGNESIA) suspension 30 mL  30 mL   • simethicone (MYLICON) chewable tab 80 mg  80 mg   • prenatal plus vitamin (STUARTNATAL 1+1) 27-1 MG tablet 1 Tab  1 Tab       Exam:  Breast Exam: negative  Abdomen: Abdomen soft, non-tender. BS normal. No masses,  No organomegaly  Fundus Non Tender: yes  Incision: dry and intact  Perineum: perineum intact  Extremity: extremities, peripheral pulses and reflexes normal     Labs:  Recent Labs      12/16/18 1931   WBC  31.8*   RBC  4.12*   HEMOGLOBIN  12.4   HEMATOCRIT  37.6   MCV  91.3   MCH  30.1   MCHC  33.0*   RDW  46.9   PLATELETCT  275   MPV  11.9        Activity:   Discharge to home  Pelvic Rest x 6 weeks    Assessment:  normal postpartum course  Discharge Assessment: No areas of skin breakdown/redness; surgical incision intact/healing     Follow up: .TPC in1 week for incision check.      Discharge Meds:   Current Outpatient Prescriptions   Medication Sig Dispense Refill   • ibuprofen (MOTRIN) 600 MG Tab Take 1 Tab by mouth every 6 hours as needed (For cramping after delivery; do not give if patient is receiving ketorolac (Toradol)). 30 Tab 0   • docusate sodium 100 MG Cap Take 100 mg by mouth 2 times a day as needed for Constipation. 60 Cap 0   • oxyCODONE-acetaminophen (PERCOCET) 5-325 MG Tab Take 1-2 Tabs by mouth every four hours as needed for up to 7 days. 30 Tab 0       LATESHA Oliver

## 2018-12-19 NOTE — DISCHARGE INSTRUCTIONS
POSTPARTUM DISCHARGE INSTRUCTIONS FOR MOM    YOB: 1995   Age: 23 y.o.               Admit Date: 12/15/2018     Discharge Date: 2018  Attending Doctor:  EDWARD Patel*                  Allergies:  Strawberry; Tape; and Vicodin [hydrocodone-acetaminophen]    Discharged to home by car. Discharged via wheelchair, hospital escort: Yes.  Special equipment needed: Not Applicable  Belongings with: Personal  Be sure to schedule a follow-up appointment with your primary care doctor or any specialists as instructed.     Discharge Plan:   Diet Plan: Discussed  Activity Level: Discussed  Smoking Cessation Offered: Patient Counseled  Confirmed Follow up Appointment: Patient to Call and Schedule Appointment  Confirmed Symptoms Management: Discussed  Medication Reconciliation Updated: Yes  Influenza Vaccine Indication: Not indicated: Previously immunized this influenza season and > 8 years of age    REASONS TO CALL YOUR OBSTETRICIAN:  1.   Persistent fever or shaking chills (Temperature higher than 100.4)  2.   Heavy bleeding (soaking more than 1 pad per hour); Passing clots  3.   Foul odor from vagina  4.   Mastitis (Breast infection; breast pain, chills, fever, redness)  5.   Urinary pain, burning or frequency  6.   Abdominal incision infection  7.   Severe depression longer than 24 hours    HAND WASHING  · Prior to handling the baby.  · Before breastfeeding or bottle feeding baby.  · After using the bathroom or changing the baby's diaper.    WOUND CARE  Ask your physician for additional care instructions.  In general:    ·  Incision:      · Keep clean and dry.    · Do NOT lift anything heavier than your baby for up to 6 weeks.    · There should not be any opening or pus.      VAGINAL CARE  · Nothing inside vagina for 6 weeks: no sexual intercourse, tampons or douching.  · Bleeding may continue for 2-4 weeks.  Amount may vary.    · Call your physician for heavy bleeding which means soaking  "more than 1 pad per hour    BIRTH CONTROL  · It is possible to become pregnant at any time after delivery and while breastfeeding.  · Plan to discuss a method of birth control with your physician at your follow up visit. visit.    DIET AND ELIMINATION  · Eating more fiber (bran cereal, fruits, and vegetables) and drinking plenty of fluids will help to avoid constipation.  · Urinary frequency after childbirth is normal.    POSTPARTUM BLUES  During the first few days after birth, you may experience a sense of the \"blues\" which may include impatience, irritability or even crying.  These feeling come and go quickly.  However, as many as 1 in 10 women experience emotional symptoms known as postpartum depression.    Postpartum depression:  May start as early as the second or third day after delivery or take several weeks or months to develop.  Symptoms of \"blues\" are present, but are more intense:  Crying spells; loss of appetite; feelings of hopelessness or loss of control; fear of touching the baby; over concern or no concern at all about the baby; little or no concern about your own appearance/caring for yourself; and/or inability to sleep or excessive sleeping.  Contact your physician if you are experiencing any of these symptoms.    Crisis Hotline:  · [place] Crisis Hotline:  3-755-PYXRSUM  Or 1-350.934.6069  · [place] Crisis Hotline:  1-477.714.4151  Or 224-344-0653    PREVENTING SHAKEN BABY:  If you are angry or stressed, PUT THE BABY IN THE CRIB, step away, take some deep breaths, and wait until you are calm to care for the baby.  DO NOT SHAKE THE BABY.  You are not alone, call a supporter for help.    · Crisis Call Center 24/7 crisis line 687-924-4038 or 1-130.280.6040  · You can also text them, text \"ANSWER\" to 092210    QUIT SMOKING/TOBACCO USE:  I understand the use of any tobacco products increases my chance of suffering from future heart disease and could cause other illnesses which may shorten my life. " Quitting the use of tobacco products is the single most important thing I can do to improve my health. For further information on smoking / tobacco cessation call a Toll Free Quit Line at 1-344.144.2125 (*[employer]) or 1-189.119.7447 (American [employer] Association) or you can access the web based program at www.lungusa.org.    · [place] Tobacco Users Help Line:  (530) 563-8445       Toll Free: 1-580.403.1377  · Quit Tobacco Program [facility] (816)718-5459    DEPRESSION / SUICIDE RISK:  As you are discharged from this [facility], it is important to learn how to keep safe from harming yourself.    Recognize the warning signs:  · Abrupt changes in personality, positive or negative- including increase in energy   · Giving away possessions  · Change in eating patterns- significant weight changes-  positive or negative  · Change in sleeping patterns- unable to sleep or sleeping all the time   · Unwillingness or inability to communicate  · Depression  · Unusual sadness, discouragement and loneliness  · Talk of wanting to die  · Neglect of personal appearance   · Rebelliousness- reckless behavior  · Withdrawal from people/activities they love  · Confusion- inability to concentrate     If you or a loved one observes any of these behaviors or has concerns about self-harm, here's what you can do:  · Talk about it- your feelings and reasons for harming yourself  · Remove any means that you might use to hurt yourself (examples: pills, rope, extension cords, firearm)  · Get professional help from the community (Mental Health, Substance Abuse, psychological counseling)  · Do not be alone:Call your Safe Contact- someone whom you trust who will be there for you.  · Call your local CRISIS HOTLINE 927-0571 or 829-353-4856  · Call your local Children's Mobile Crisis Response Team [place] (183) 835-9961 or www.Netzoptiker  · Call the toll free National Suicide Prevention Hotlines    · National Suicide Prevention Lifeline 141-266-BVHY (7970)  · National Hope Line Network 800-SUICIDE (153-9915)    DISCHARGE SURVEY:  Thank you for choosing [facility].  We hope we provided you with very good care.  You may be receiving a survey in the mail.  Please fill it out.  Your opinion is valuable to us.    ADDITIONAL EDUCATIONAL MATERIALS GIVEN TO PATIENT:  Pelvic rest for 6 weeks  Ambulate  Encourage breastfeeding  Depo-shot 150 mg IM prior to discharge if indicated  RhoGam 300 mcg IM prior to discharge if indicated  MMR vaccine SQ prior to discharge if indicated  For C/sec patient, remove staples and place steri-strips prior to discharge  Sitz bath PRN  If diabetic, continue to check blood sugars as previously directed        My signature on this form indicates that:  1.  I have reviewed and understand the above information  2.  My questions regarding this information have been answered to my satisfaction.  3.  I have formulated a plan with my discharge nurse to obtain my prescribed medication for home.

## 2018-12-20 NOTE — PROGRESS NOTES
May go home anytime. Awaiting infant. Education instructions done by Annamarie PANG. All needs attended.   2048- home with infant in good condition.

## 2019-05-13 ENCOUNTER — HOSPITAL ENCOUNTER (EMERGENCY)
Facility: MEDICAL CENTER | Age: 24
End: 2019-05-13
Attending: EMERGENCY MEDICINE
Payer: MEDICAID

## 2019-05-13 VITALS
HEART RATE: 56 BPM | TEMPERATURE: 97 F | OXYGEN SATURATION: 94 % | HEIGHT: 57 IN | RESPIRATION RATE: 18 BRPM | WEIGHT: 137.57 LBS | DIASTOLIC BLOOD PRESSURE: 68 MMHG | SYSTOLIC BLOOD PRESSURE: 110 MMHG | BODY MASS INDEX: 29.68 KG/M2

## 2019-05-13 DIAGNOSIS — R10.33 PERIUMBILICAL ABDOMINAL PAIN: ICD-10-CM

## 2019-05-13 DIAGNOSIS — R19.7 VOMITING AND DIARRHEA: ICD-10-CM

## 2019-05-13 DIAGNOSIS — R11.10 VOMITING AND DIARRHEA: ICD-10-CM

## 2019-05-13 LAB
ALBUMIN SERPL BCP-MCNC: 4.8 G/DL (ref 3.2–4.9)
ALBUMIN/GLOB SERPL: 1.7 G/DL
ALP SERPL-CCNC: 90 U/L (ref 30–99)
ALT SERPL-CCNC: 21 U/L (ref 2–50)
AMORPH CRY #/AREA URNS HPF: PRESENT /HPF
ANION GAP SERPL CALC-SCNC: 9 MMOL/L (ref 0–11.9)
APPEARANCE UR: ABNORMAL
AST SERPL-CCNC: 20 U/L (ref 12–45)
BACTERIA #/AREA URNS HPF: NEGATIVE /HPF
BASOPHILS # BLD AUTO: 0.6 % (ref 0–1.8)
BASOPHILS # BLD: 0.06 K/UL (ref 0–0.12)
BILIRUB SERPL-MCNC: 1.4 MG/DL (ref 0.1–1.5)
BILIRUB UR QL STRIP.AUTO: NEGATIVE
BUN SERPL-MCNC: 12 MG/DL (ref 8–22)
CALCIUM SERPL-MCNC: 9.7 MG/DL (ref 8.5–10.5)
CHLORIDE SERPL-SCNC: 106 MMOL/L (ref 96–112)
CO2 SERPL-SCNC: 22 MMOL/L (ref 20–33)
COLOR UR: YELLOW
CREAT SERPL-MCNC: 0.6 MG/DL (ref 0.5–1.4)
EOSINOPHIL # BLD AUTO: 0.06 K/UL (ref 0–0.51)
EOSINOPHIL NFR BLD: 0.6 % (ref 0–6.9)
EPI CELLS #/AREA URNS HPF: NORMAL /HPF
ERYTHROCYTE [DISTWIDTH] IN BLOOD BY AUTOMATED COUNT: 39.6 FL (ref 35.9–50)
GLOBULIN SER CALC-MCNC: 2.8 G/DL (ref 1.9–3.5)
GLUCOSE SERPL-MCNC: 113 MG/DL (ref 65–99)
GLUCOSE UR STRIP.AUTO-MCNC: NEGATIVE MG/DL
HCG SERPL QL: NEGATIVE
HCT VFR BLD AUTO: 45.2 % (ref 37–47)
HGB BLD-MCNC: 15.1 G/DL (ref 12–16)
IMM GRANULOCYTES # BLD AUTO: 0.04 K/UL (ref 0–0.11)
IMM GRANULOCYTES NFR BLD AUTO: 0.4 % (ref 0–0.9)
KETONES UR STRIP.AUTO-MCNC: NEGATIVE MG/DL
LEUKOCYTE ESTERASE UR QL STRIP.AUTO: NEGATIVE
LIPASE SERPL-CCNC: 10 U/L (ref 11–82)
LYMPHOCYTES # BLD AUTO: 0.71 K/UL (ref 1–4.8)
LYMPHOCYTES NFR BLD: 7 % (ref 22–41)
MCH RBC QN AUTO: 30.1 PG (ref 27–33)
MCHC RBC AUTO-ENTMCNC: 33.4 G/DL (ref 33.6–35)
MCV RBC AUTO: 90.2 FL (ref 81.4–97.8)
MICRO URNS: ABNORMAL
MONOCYTES # BLD AUTO: 0.4 K/UL (ref 0–0.85)
MONOCYTES NFR BLD AUTO: 4 % (ref 0–13.4)
NEUTROPHILS # BLD AUTO: 8.83 K/UL (ref 2–7.15)
NEUTROPHILS NFR BLD: 87.4 % (ref 44–72)
NITRITE UR QL STRIP.AUTO: NEGATIVE
NRBC # BLD AUTO: 0 K/UL
NRBC BLD-RTO: 0 /100 WBC
PH UR STRIP.AUTO: 5.5 [PH]
PLATELET # BLD AUTO: 304 K/UL (ref 164–446)
PMV BLD AUTO: 9.9 FL (ref 9–12.9)
POTASSIUM SERPL-SCNC: 3.8 MMOL/L (ref 3.6–5.5)
PROT SERPL-MCNC: 7.6 G/DL (ref 6–8.2)
PROT UR QL STRIP: NEGATIVE MG/DL
RBC # BLD AUTO: 5.01 M/UL (ref 4.2–5.4)
RBC UR QL AUTO: ABNORMAL
SODIUM SERPL-SCNC: 137 MMOL/L (ref 135–145)
SP GR UR STRIP.AUTO: 1.02
UROBILINOGEN UR STRIP.AUTO-MCNC: 0.2 MG/DL
WBC # BLD AUTO: 10.1 K/UL (ref 4.8–10.8)
WBC #/AREA URNS HPF: NORMAL /HPF

## 2019-05-13 PROCEDURE — 96374 THER/PROPH/DIAG INJ IV PUSH: CPT

## 2019-05-13 PROCEDURE — 700102 HCHG RX REV CODE 250 W/ 637 OVERRIDE(OP): Performed by: EMERGENCY MEDICINE

## 2019-05-13 PROCEDURE — 81001 URINALYSIS AUTO W/SCOPE: CPT

## 2019-05-13 PROCEDURE — 700111 HCHG RX REV CODE 636 W/ 250 OVERRIDE (IP): Performed by: EMERGENCY MEDICINE

## 2019-05-13 PROCEDURE — 99285 EMERGENCY DEPT VISIT HI MDM: CPT

## 2019-05-13 PROCEDURE — 96375 TX/PRO/DX INJ NEW DRUG ADDON: CPT

## 2019-05-13 PROCEDURE — 84703 CHORIONIC GONADOTROPIN ASSAY: CPT

## 2019-05-13 PROCEDURE — A9270 NON-COVERED ITEM OR SERVICE: HCPCS | Performed by: EMERGENCY MEDICINE

## 2019-05-13 PROCEDURE — 36415 COLL VENOUS BLD VENIPUNCTURE: CPT

## 2019-05-13 PROCEDURE — 81002 URINALYSIS NONAUTO W/O SCOPE: CPT | Performed by: EMERGENCY MEDICINE

## 2019-05-13 PROCEDURE — 83690 ASSAY OF LIPASE: CPT

## 2019-05-13 PROCEDURE — 85025 COMPLETE CBC W/AUTO DIFF WBC: CPT

## 2019-05-13 PROCEDURE — 80053 COMPREHEN METABOLIC PANEL: CPT

## 2019-05-13 PROCEDURE — 700105 HCHG RX REV CODE 258: Performed by: EMERGENCY MEDICINE

## 2019-05-13 RX ORDER — MORPHINE SULFATE 4 MG/ML
4 INJECTION, SOLUTION INTRAMUSCULAR; INTRAVENOUS ONCE
Status: COMPLETED | OUTPATIENT
Start: 2019-05-13 | End: 2019-05-13

## 2019-05-13 RX ORDER — ONDANSETRON 2 MG/ML
4 INJECTION INTRAMUSCULAR; INTRAVENOUS
Status: DISCONTINUED | OUTPATIENT
Start: 2019-05-13 | End: 2019-05-13 | Stop reason: HOSPADM

## 2019-05-13 RX ORDER — LOPERAMIDE HYDROCHLORIDE 2 MG/1
TABLET ORAL
Qty: 20 TAB | Refills: 0 | Status: SHIPPED | OUTPATIENT
Start: 2019-05-13 | End: 2023-04-11

## 2019-05-13 RX ORDER — SODIUM CHLORIDE 9 MG/ML
1000 INJECTION, SOLUTION INTRAVENOUS ONCE
Status: COMPLETED | OUTPATIENT
Start: 2019-05-13 | End: 2019-05-13

## 2019-05-13 RX ORDER — ONDANSETRON 4 MG/1
4 TABLET, ORALLY DISINTEGRATING ORAL EVERY 8 HOURS PRN
Qty: 10 TAB | Refills: 0 | Status: SHIPPED | OUTPATIENT
Start: 2019-05-13

## 2019-05-13 RX ORDER — ACETAMINOPHEN 325 MG/1
650 TABLET ORAL ONCE
Status: COMPLETED | OUTPATIENT
Start: 2019-05-13 | End: 2019-05-13

## 2019-05-13 RX ADMIN — ONDANSETRON 4 MG: 2 INJECTION INTRAMUSCULAR; INTRAVENOUS at 08:42

## 2019-05-13 RX ADMIN — ACETAMINOPHEN 650 MG: 325 TABLET, FILM COATED ORAL at 08:55

## 2019-05-13 RX ADMIN — SODIUM CHLORIDE 1000 ML: 9 INJECTION, SOLUTION INTRAVENOUS at 08:42

## 2019-05-13 RX ADMIN — MORPHINE SULFATE 4 MG: 4 INJECTION INTRAVENOUS at 08:52

## 2019-05-13 NOTE — ED PROVIDER NOTES
nasuea vomiting diarrhea, abdominal pain last night. TUMS but unable to tolerate fluids. Archie a few days ago and had [employer].         ED Provider Note    Scribed for Abiel Carrillo M.D. by Esteban Hylton. 5/13/2019  8:31 AM    Primary care provider: Pcp Pt States None  Means of arrival: Walk-In  History obtained from: Patient  History limited by: None    CHIEF COMPLAINT  Chief Complaint   Patient presents with   • Abdominal Pain     generalized abd pain, N/V/D since last night.    • Nausea/Vomiting/Diarrhea       HPI  Sylwia Downing is a 23 y.o. female who presents to the Emergency Department for vomiting onset last night. In the last 24 hours, she has had 12 episodes of clear emesis.  She has associated abdominal pain rated 7/10 which onset later and has had multiple episodes of yellow-green diarrhea. She has not been able to tolerate food or fluids. The patient took Tums without alleviation. The patient endorses associated headache, sweats, and chills but denies hematochezia, dysuria, urinary urgency, lower extremity edema. She denies any recent foreign travel, and had [employer] in [place] but no other abnormal exposures. The patient uses marijuana once daily. No sick contact at work. The patient has a history of ulcers and states that this may be similar. She denies frequent NSAID use.       REVIEW OF SYSTEMS  Pertinent positives include: vomiting, abdominal pain, diarrhea, headache, sweats, and chills.  Pertinent negatives include: hematochezia, dysuria, urinary urgency, and lower extremity edema.  10+ systems reviewed and negative.      PAST MEDICAL HISTORY  Past Medical History:   Diagnosis Date   • Peptic ulcer     at age 15 - 18 yrs old   Patient is 5 months post partum and recently started taking daily birth control.     History of bladder infection and ulcers.     FAMILY HISTORY  Family History   Problem Relation Age of Onset   • Stroke Mother    • Cancer Father         had  "thyroid cancer   • Cancer Maternal Grandmother         had breast cancer   • Cancer Paternal Grandmother         unkown cancer   • Cancer Paternal Grandfather         had lung canser       SOCIAL HISTORY  Social History   Substance Use Topics   • Smoking status: Former Smoker     Packs/day: 0.50     Years: 3.00     Types: Cigarettes   • Smokeless tobacco: Never Used      Comment: Quit smoking when she found out she waspregnant   • Alcohol use No      Comment: rare     History   Drug Use   • Types: Inhaled, Marijuana     Comment: smoked marijuana when she was in high school       SURGICAL HISTORY  Past Surgical History:   Procedure Laterality Date   • PRIMARY C SECTION  12/16/2018    Procedure: PRIMARY C SECTION;  Surgeon: July Almaraz M.D.;  Location: LABOR AND DELIVERY;  Service: Obstetrics   • DENTAL EXTRACTION(S)      wisdmon tooth at age 18   • TONSILLECTOMY         CURRENT MEDICATIONS  Home Medications     Reviewed by Andrae Gordon R.N. (Registered Nurse) on 05/13/19 at 0718  Med List Status: Partial   Medication Last Dose Status   NON SPECIFIED 5/12/2019 Active                ALLERGIES  Allergies   Allergen Reactions   • Strawberry Anaphylaxis   • Tape Hives     Adhesive tape    • Vicodin [Hydrocodone-Acetaminophen]        PHYSICAL EXAM  VITAL SIGNS: /68   Pulse 82   Temp 36.1 °C (97 °F) (Temporal)   Resp 18   Ht 1.448 m (4' 9\")   Wt 62.4 kg (137 lb 9.1 oz)   LMP 05/10/2019   SpO2 96%   BMI 29.77 kg/m²   Reviewed and normal including afebrile  Constitutional: Well developed, Well nourished, moderate distress secondary to nausea.  HENT: Normocephalic, atraumatic, bilateral external ears normal, oropharynx moist, No exudates or erythema.   Eyes: PERRLA, conjunctiva pink, no scleral icterus.   Cardiovascular: Regular rate and rhythm. No murmurs, rubs or gallops.  Respiratory: Lungs clear to auscultation bilaterally. No wheezes, rales, or rhonchi.  Abdominal:  Abdomen soft, quiet bowel " sounds, minimal to no objective tenderness, no masses, non distended. No rebound, or guarding.  Skin: No erythema, no rash.   Genitourinary: No costovertebral angle tenderness.   Musculoskeletal: No edema.   Neurologic: Alert & oriented x 3, cranial nerves 2-12 intact by passive exam.  No focal deficit noted.  Psychiatric: Affect normal, Judgment normal, Mood normal.     DIFFERENTIAL DIAGNOSIS:  Viral syndrome, food poisoning, bacterial gastroenteritis, doubt bowel obstruction, doubt pancreatitis, doubt pyelonephritis, doubt pregnancy, doubt strep throat, doubt elevated intracranial pressure.    LABORATORY:  Results for orders placed or performed during the hospital encounter of 05/13/19   CBC WITH DIFFERENTIAL   Result Value Ref Range    WBC 10.1 4.8 - 10.8 K/uL    RBC 5.01 4.20 - 5.40 M/uL    Hemoglobin 15.1 12.0 - 16.0 g/dL    Hematocrit 45.2 37.0 - 47.0 %    MCV 90.2 81.4 - 97.8 fL    MCH 30.1 27.0 - 33.0 pg    MCHC 33.4 (L) 33.6 - 35.0 g/dL    RDW 39.6 35.9 - 50.0 fL    Platelet Count 304 164 - 446 K/uL    MPV 9.9 9.0 - 12.9 fL    Neutrophils-Polys 87.40 (H) 44.00 - 72.00 %    Lymphocytes 7.00 (L) 22.00 - 41.00 %    Monocytes 4.00 0.00 - 13.40 %    Eosinophils 0.60 0.00 - 6.90 %    Basophils 0.60 0.00 - 1.80 %    Immature Granulocytes 0.40 0.00 - 0.90 %    Nucleated RBC 0.00 /100 WBC    Neutrophils (Absolute) 8.83 (H) 2.00 - 7.15 K/uL    Lymphs (Absolute) 0.71 (L) 1.00 - 4.80 K/uL    Monos (Absolute) 0.40 0.00 - 0.85 K/uL    Eos (Absolute) 0.06 0.00 - 0.51 K/uL    Baso (Absolute) 0.06 0.00 - 0.12 K/uL    Immature Granulocytes (abs) 0.04 0.00 - 0.11 K/uL    NRBC (Absolute) 0.00 K/uL   COMP METABOLIC PANEL   Result Value Ref Range    Sodium 137 135 - 145 mmol/L    Potassium 3.8 3.6 - 5.5 mmol/L    Chloride 106 96 - 112 mmol/L    Co2 22 20 - 33 mmol/L    Anion Gap 9.0 0.0 - 11.9    Glucose 113 (H) 65 - 99 mg/dL    Bun 12 8 - 22 mg/dL    Creatinine 0.60 0.50 - 1.40 mg/dL    Calcium 9.7 8.5 - 10.5 mg/dL    AST(SGOT)  20 12 - 45 U/L    ALT(SGPT) 21 2 - 50 U/L    Alkaline Phosphatase 90 30 - 99 U/L    Total Bilirubin 1.4 0.1 - 1.5 mg/dL    Albumin 4.8 3.2 - 4.9 g/dL    Total Protein 7.6 6.0 - 8.2 g/dL    Globulin 2.8 1.9 - 3.5 g/dL    A-G Ratio 1.7 g/dL   LIPASE   Result Value Ref Range    Lipase 10 (L) 11 - 82 U/L   HCG QUAL SERUM   Result Value Ref Range    Beta-Hcg Qualitative Serum Negative Negative   URINALYSIS,CULTURE IF INDICATED   Result Value Ref Range    Color Yellow     Character Cloudy (A)     Specific Gravity 1.025 <1.035    Ph 5.5 5.0 - 8.0    Glucose Negative Negative mg/dL    Ketones Negative Negative mg/dL    Protein Negative Negative mg/dL    Bilirubin Negative Negative    Urobilinogen, Urine 0.2 Negative    Nitrite Negative Negative    Leukocyte Esterase Negative Negative    Occult Blood Large (A) Negative    Micro Urine Req Microscopic        Lab results reviewed by me.     INTERVENTIONS:  Medications   ondansetron (ZOFRAN) syringe/vial injection 4 mg (4 mg Intravenous Given 5/13/19 0842)   NS infusion 1,000 mL (0 mL Intravenous Stopped 5/13/19 1277)   morphine (pf) 4 mg/ml injection 4 mg (4 mg Intravenous Given 5/13/19 0865)   acetaminophen (TYLENOL) tablet 650 mg (650 mg Oral Given 5/13/19 0819)     Response: Resolution of nausea and vomiting, resolution of pain, tolerated p.o. trial.    ED COURSE:  Nursing notes, VS, PMSFHx reviewed in chart.     8:31 AM - Patient seen and examined at bedside. Patient will be treated with 1L NS fluids, 4 mg zofran, 650 mg Tylenol, and 4 mg morphine for her symptoms. Ordered urinalysis with culture if indicated, HCG Qual, Lipase, CMP, CBC with differential, estimated GFr, urine microscopic, and POC urinalysis to evaluate.     9:42 AM - I reevaluated the patient at bedside who feels improved. The patient would like to be discharged, and I am agreeable to this after the remaining fluids are administered.     HYDRATION: Based on the patient's presentation of presumed dehydration  secondary to multiple episodes of vomiting and diarrhea the patient was given IV fluids. IV Hydration was used because oral hydration failed due to inability to tolerate PO. Upon recheck following hydration, the patient was better hydrated and pain-free.    MEDICAL DECISION MAKING:  This patient presents with abdominal pain vomiting and diarrhea and likely has a viral gastroenteritis.  Food poisoning is possible.  There is no evidence of UTI or pregnancy.  There is no clinical evidence of bowel obstruction.  There is no evidence of meningitis or clinical strep throat.  She is a good candidate for outpatient trial of symptomatic care    PLAN:  New Prescriptions    LOPERAMIDE (IMODIUM A-D) 2 MG TABLET    Take 2 tablets for diarrhea and one tablet after each loose stool to max 8 tablets daily    ONDANSETRON (ZOFRAN ODT) 4 MG TABLET DISPERSIBLE    Take 1 Tab by mouth every 8 hours as needed.       Vomiting handout given  Return for trouble vomiting, significant abdominal pain, GI bleed, dizziness, ill appearance    [facility], Emergency Dept  [address] [facility] [id number] [phone]  Schedule an appointment as soon as possible for a visit   As needed for uncontrolled vomiting, severe pain, dizziness, GI bleed or ill appearance    [address] [address]  [phone]  Schedule an appointment as soon as possible for a visit   As needed for new primary      CONDITION: Good.     FINAL IMPRESSION  1. Periumbilical abdominal pain    2. Vomiting and diarrhea          Esteban DAN (Thais), am scribing for, and in the presence of, Abiel Carrillo M.D..    Electronically signed by: Esteban Hylton (Thais), 5/13/2019    Abiel DAN M.D. personally performed the services described in this documentation, as scribed by Esteban Hylton in my presence, and it is both accurate and complete.    The note accurately reflects work and decisions made by me.  Abiel BEAR  Lorena  5/13/2019  2:55 PM    C

## 2019-05-13 NOTE — DISCHARGE INSTRUCTIONS
Diarrhea, Home Care Instructions for    Take antidiarrheal medication as needed.  Take antiemetic if needed for vomiting.  Drink fluids.  Eat fiber and avoid fruit and milk products.  Return for renewed fever, constant worsening pain, bloody diarrhea or uncontrolled vomiting.     See a doctor if not improving in 4-5 days.     You had a borderline or high normal blood pressure reading today.  This does not necessarily mean you have hypertension.  Please followup with your/a primary physician for comprehensive blood pressure evaluation and yearly fasting cholesterol assessment.  BP Readings from Last 3 Encounters:   05/13/19 110/68   12/19/18 127/82   12/13/18 110/70           Bacterial (germ) infections or a virus commonly cause diarrhea. Your caregiver has determined that with time, rest, fluids and/or possibly anti-diarrheal medication, you should see improvement in your condition. Until your diarrhea is under control, you should drink clear liquids frequently, in small amounts. Clear liquids include: water, broth, jello water, apple juice, weak tea, and 7-Up®.     AVOID:  Milk  Fruits  Extremely hot or cold fluids Alcohol  Tobacco  Too much intake of anything at one time     When your diarrhea stops you may add the following foods which help the stool to become more formed:    Rice Bananas Dry toast Apples without the skin     Once these foods are tolerated you may add low-fat yogurt and low-fat cottage cheese. They will restore the normal bacterial balance in your bowel.  Wash your hands well to avoid spreading any bacteria or virus.    SEEK IMMEDIATE MEDICAL ATTENTION IF:  You are unable to keep fluids down.  Vomiting or diarrhea becomes persistent.  Abdominal (belly) pain develops or increases or localizes. (Right sided pain can be appendicitis and left sided pain in adults can be diverticulitis).  You develop a high fever, which is an oral temperature above 102° F (38.9° C), or as your caregiver  suggests.  Diarrhea becomes excessive or contains blood or mucous.  Excessive weakness, dizziness, fainting or extreme thirst.  Checking weight 2 to 3 times per day in babies and children will help verify adequate fluid replacement. Your caregiver will tell you what loss should concern you or suggest another visit to your personal physician.  Record your weight or your child's weight today. Compare this to your home scale and record all weights and time and date weighed. Try to check weight at the same times every day. Bring this chart to your caregivers if you or your child needs to be seen again.    Document Released: 12/18/2006  Document Re-Released: 06/11/2007  [employer]® Patient Information ©2008 [employer], [employer].

## 2019-05-13 NOTE — ED TRIAGE NOTES
"Chief Complaint   Patient presents with   • Abdominal Pain     generalized abd pain, N/V/D since last night.    • Nausea/Vomiting/Diarrhea     Pt to triage for above. NAD noted. VSS. Denies fevers.     Pt returned to lobby. Educated on triage process and to inform staff of any changes.     /68   Pulse 82   Temp 36.1 °C (97 °F) (Temporal)   Resp 18   Ht 1.448 m (4' 9\")   Wt 62.4 kg (137 lb 9.1 oz)   LMP 05/10/2019   SpO2 96%   BMI 29.77 kg/m²     "

## 2019-05-14 NOTE — DISCHARGE PLANNING
Pt requesting work note. Note written that pt was seen in ED on 5/13/2019 and placed in triage binder. She will  later.

## 2020-05-10 ENCOUNTER — HOSPITAL ENCOUNTER (EMERGENCY)
Facility: MEDICAL CENTER | Age: 25
End: 2020-05-11
Attending: EMERGENCY MEDICINE
Payer: MEDICAID

## 2020-05-10 ENCOUNTER — APPOINTMENT (OUTPATIENT)
Dept: RADIOLOGY | Facility: MEDICAL CENTER | Age: 25
End: 2020-05-10
Attending: EMERGENCY MEDICINE
Payer: MEDICAID

## 2020-05-10 VITALS
BODY MASS INDEX: 30.92 KG/M2 | OXYGEN SATURATION: 99 % | TEMPERATURE: 97.5 F | HEART RATE: 72 BPM | RESPIRATION RATE: 17 BRPM | SYSTOLIC BLOOD PRESSURE: 131 MMHG | DIASTOLIC BLOOD PRESSURE: 60 MMHG | HEIGHT: 57 IN | WEIGHT: 143.3 LBS

## 2020-05-10 DIAGNOSIS — S62.639A CLOSED FRACTURE OF TUFT OF DISTAL PHALANX OF FINGER: ICD-10-CM

## 2020-05-10 DIAGNOSIS — S60.10XA SUBUNGUAL HEMATOMA OF FINGER, INITIAL ENCOUNTER: ICD-10-CM

## 2020-05-10 DIAGNOSIS — S67.10XA CRUSHING INJURY OF FINGER, INITIAL ENCOUNTER: ICD-10-CM

## 2020-05-10 PROCEDURE — 304217 HCHG IRRIGATION SYSTEM

## 2020-05-10 PROCEDURE — 99283 EMERGENCY DEPT VISIT LOW MDM: CPT

## 2020-05-10 PROCEDURE — 304999 HCHG REPAIR-SIMPLE/INTERMED LEVEL 1

## 2020-05-10 PROCEDURE — 11740 EVACUATION SUBUNGUAL HMTMA: CPT

## 2020-05-10 PROCEDURE — 303353 HCHG DERMABOND SKIN ADHESIVE

## 2020-05-10 RX ORDER — LIDOCAINE HYDROCHLORIDE 10 MG/ML
6 INJECTION, SOLUTION INFILTRATION; PERINEURAL ONCE
Status: COMPLETED | OUTPATIENT
Start: 2020-05-11 | End: 2020-05-11

## 2020-05-10 ASSESSMENT — FIBROSIS 4 INDEX: FIB4 SCORE: 0.33

## 2020-05-11 PROCEDURE — 73140 X-RAY EXAM OF FINGER(S): CPT | Mod: LT

## 2020-05-11 PROCEDURE — 700101 HCHG RX REV CODE 250: Performed by: EMERGENCY MEDICINE

## 2020-05-11 RX ADMIN — LIDOCAINE HYDROCHLORIDE 6 ML: 10 INJECTION, SOLUTION INFILTRATION; PERINEURAL at 00:15

## 2020-05-11 NOTE — ED PROVIDER NOTES
"ED Provider Note    CHIEF COMPLAINT  Chief Complaint   Patient presents with   • Digit Pain     left hand pinky. \"I think I broke my finger\"       HPI  Sylwia Downing is a 24 y.o. female who presents with a crush injury to the tip of her left fifth digit.  She states that she got it crushed in a piece of furniture.  She thought she felt a popping sensation.  Has bruising underneath the fingernail and a small laceration to the tip of her finger.  No active bleeding.  Has some slight numbness to the tip of her finger.  No other injuries.  No significant medical issues such as diabetes.  She is up-to-date with her tetanus.    REVIEW OF SYSTEMS  See HPI for further details. All other systems are negative.     PAST MEDICAL HISTORY   has a past medical history of Peptic ulcer.    SOCIAL HISTORY  Social History     Tobacco Use   • Smoking status: Former Smoker     Packs/day: 0.50     Years: 3.00     Pack years: 1.50     Types: Cigarettes   • Smokeless tobacco: Never Used   • Tobacco comment: Quit smoking when she found out she waspregnant   Substance and Sexual Activity   • Alcohol use: No     Comment: rare   • Drug use: Yes     Types: Inhaled, Marijuana     Comment: smoked marijuana when she was in high school   • Sexual activity: Yes     Partners: Male     Birth control/protection: Pill     Comment: Unplanned pregnancy       SURGICAL HISTORY   has a past surgical history that includes tonsillectomy; dental extraction(s); and primary c section (12/16/2018).    CURRENT MEDICATIONS  Home Medications     Reviewed by Stewart Luna R.N. (Registered Nurse) on 05/10/20 at 2314  Med List Status: Partial   Medication Last Dose Status   loperamide (IMODIUM A-D) 2 MG tablet  Active   NON SPECIFIED  Active   ondansetron (ZOFRAN ODT) 4 MG TABLET DISPERSIBLE  Active                ALLERGIES  Allergies   Allergen Reactions   • Strawberry Anaphylaxis   • Tape Hives     Adhesive tape    • Vicodin [Hydrocodone-Acetaminophen]  " "      PHYSICAL EXAM  VITAL SIGNS: /60   Pulse 72   Temp 36.4 °C (97.5 °F) (Temporal)   Resp 17   Ht 1.448 m (4' 9\")   Wt 65 kg (143 lb 4.8 oz)   SpO2 99%   BMI 31.01 kg/m²   Pulse ox interpretation: I interpret this pulse ox as normal.  Constitutional: Alert in no apparent distress.  Cardiovascular: Regular rate and rhythm.   Thorax & Lungs: Normal breath sounds, No respiratory distress.  Skin: Warm, Dry, No erythema, No rash.  Approximately 2 cm abrasion to the tip of the finger  Back: No bony tenderness, No CVA tenderness.   Extremities: Intact distal pulses, No edema, left fifth digit tenderness to the distal phalanx.  Subungual hematoma.  Musculoskeletal: Good range of motion in all major joints. No major deformities noted.   Neurologic: Alert, Normal motor function and gait, Normal sensory function, No focal deficits noted.       DIAGNOSTIC STUDIES / PROCEDURES      RADIOLOGY  DX-FINGER(S) 2+ LEFT   Final Result      Fracture of the distal tuft of the distal phalanx of the fifth finger        Laceration Repair Procedure Note    Indication: Laceration    Procedure: The patient was placed in the appropriate position and anesthesia around the laceration was obtained with a full digital block of the left short (pinkie) finger using 1% Lidocaine without epinephrine. The area was then irrigated with normal saline. The laceration was closed with Dermabond. There were no additional lacerations requiring repair. The wound area was then dressed with a sterile dressing.      Total repaired wound length: 1.5 cm.     Other Items: None    The patient tolerated the procedure well.    Complications: None      Subungual Hematoma Procedure Note    Indication: Large subungual hematoma with severe pain    Procedure: The patient’s hand was placed in the appropriate position. Anesthesia was obtained with a full digital block of the left short (pinkie) finger using 2.0 cc of 1% Lidocaine without epinephrine. The nail was " prepped with Chloraprep and then trephinated with the tip of a large bore needle until the pressurized blood was released and free flowing. The hole was then covered with a bandage. The patient’s tetanus status was updated with a tetanus booster.    The patient tolerated the procedure without difficulty.    Complications: None        COURSE & MEDICAL DECISION MAKING    Medications   lidocaine (XYLOCAINE) 1%  injection (6 mL Infiltration Given by Provider 5/11/20 0015)       Pertinent Labs & Imaging studies reviewed. (See chart for details)  24 y.o. female presenting with crush injury to her left fifth digit.  The patient was given a digital block to the fifth digit using 1% lidocaine without epinephrine, 2 cc were administered for pain relief.  Wounds were irrigated.     There is a superficial laceration to the tip of the finger not involving the nail bed.  No active bleeding.  Wound was closed with Dermabond.  It is not gaping in nature.  Does not appear to be deep.  Has presence of subungual hematoma.  Trephination was performed to relieve pressure.  The nail appears to be fully intact.  No obvious bony deformity.  X-ray was performed showing distal tuft fracture.  Does not appear to be open.  Patient was placed in a short finger splint.    Faint laceration to the tip of the finger that is not gaping and does not have active bleeding.  This was closed with Dermabond.    Patient was informed that nondisplaced fractures like her tuft fracture should heal within the next 6 to 8 weeks.  Recommending protecting the area of injury from further trauma and to use the splint as needed.  To follow-up with orthopedic surgery only on an as-needed basis.  She was given return precautions for signs of infection such as erythema, worsening swelling or pain, purulent drainage.  Wound care instructions were provided.  Patient was discharged home in stable condition.    The patient will not drink alcohol nor drive with prescribed  "medications.   The patient was instructed to follow-up with primary care physician for further management.  To return immediately for any worsening symptoms or development of any other concerning signs or symptoms. The patient verbalizes understanding in their own words.    /60   Pulse 72   Temp 36.4 °C (97.5 °F) (Temporal)   Resp 17   Ht 1.448 m (4' 9\")   Wt 65 kg (143 lb 4.8 oz)   SpO2 99%   BMI 31.01 kg/m²     The patient was referred to primary care where they will receive further BP management.      [facility], Emergency Dept  [address] [address] [phone]    As needed, If symptoms worsen    Tucker Melgoza M.D.  [address]  [address]  [facility] [address]  [phone]      As needed    Primary care doctor    Schedule an appointment as soon as possible for a visit         FINAL IMPRESSION  1. Closed fracture of tuft of distal phalanx of finger    2. Subungual hematoma of finger, initial encounter    3. Crushing injury of finger, initial encounter            Electronically signed by: Samm Simental M.D., 5/10/2020 11:42 PM    "

## 2020-05-11 NOTE — ED TRIAGE NOTES
"Chief Complaint   Patient presents with   • Digit Pain     left hand pinky. \"I think I broke my finger\"       Pt ambulatory to triage for above complaint.    Pt is alert/oriented and follows commands. Pt speaking in full sentences and responds appropriately to questions. No acute distress noted in triage and respirations are even and unlabored.     Pt placed in lobby and educated on triage process. Pt encouraged to alert staff for any changes in condition.  "
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
## 2023-01-07 ENCOUNTER — APPOINTMENT (OUTPATIENT)
Dept: URGENT CARE | Facility: CLINIC | Age: 28
End: 2023-01-07
Payer: MEDICAID

## 2023-01-08 ENCOUNTER — OFFICE VISIT (OUTPATIENT)
Dept: URGENT CARE | Facility: CLINIC | Age: 28
End: 2023-01-08
Payer: MEDICAID

## 2023-01-08 VITALS
OXYGEN SATURATION: 96 % | TEMPERATURE: 97.9 F | BODY MASS INDEX: 24.81 KG/M2 | WEIGHT: 115 LBS | HEIGHT: 57 IN | DIASTOLIC BLOOD PRESSURE: 64 MMHG | SYSTOLIC BLOOD PRESSURE: 102 MMHG | RESPIRATION RATE: 14 BRPM | HEART RATE: 102 BPM

## 2023-01-08 DIAGNOSIS — J02.0 STREP PHARYNGITIS: ICD-10-CM

## 2023-01-08 PROBLEM — M54.9 BACKACHE: Status: ACTIVE | Noted: 2017-08-14

## 2023-01-08 LAB
INT CON NEG: NEGATIVE
INT CON POS: POSITIVE
S PYO AG THROAT QL: POSITIVE

## 2023-01-08 PROCEDURE — 87880 STREP A ASSAY W/OPTIC: CPT

## 2023-01-08 PROCEDURE — 99213 OFFICE O/P EST LOW 20 MIN: CPT

## 2023-01-08 RX ORDER — AMOXICILLIN 500 MG/1
500 CAPSULE ORAL 2 TIMES DAILY
Qty: 20 CAPSULE | Refills: 0 | Status: SHIPPED | OUTPATIENT
Start: 2023-01-08 | End: 2023-01-18

## 2023-01-08 NOTE — LETTER
January 8, 2023    To Whom It May Concern:    This is confirmation that Sylwia Downing was seen in urgent care today for illness. Please excuse her from work on 01/07/23 - 01/09/23. If you have any questions please do not hesitate to call me at the phone number listed below.    Sincerely,          STEVAN Villanueva.P.  [phone]

## 2023-01-09 NOTE — PROGRESS NOTES
Subjective:   Sylwia Downing is a 27 y.o. female who presents for Sore Throat (X yesterday with weird texture on back of tongue.  Denies fever or chills. )      HPI:    Patient presents to urgent care with notes of sore throat, white patches on her uvula.  Ports she had her tonsils removed in .  She denies runny nose, nasal congestion, cough.  Onset was 1 day ago and is rapidly worsening  Mild improvement with warm tea, lozenges  Denies Fever, chills, night sweats, nausea, vomiting, diarrheas  She states her 4-year-old son has had diarrhea, and has had reduced oral intake due to pain of his throat.  He has not been tested for strep at this time.      ROS As above in HPI    Medications:    Current Outpatient Medications on File Prior to Visit   Medication Sig Dispense Refill    NON SPECIFIED  (Patient not taking: Reported on 2023)      ondansetron (ZOFRAN ODT) 4 MG TABLET DISPERSIBLE Take 1 Tab by mouth every 8 hours as needed. (Patient not taking: Reported on 2023) 10 Tab 0    loperamide (IMODIUM A-D) 2 MG tablet Take 2 tablets for diarrhea and one tablet after each loose stool to max 8 tablets daily (Patient not taking: Reported on 2023) 20 Tab 0     No current facility-administered medications on file prior to visit.        Allergies:   Strawberry, Tape, and Vicodin [hydrocodone-acetaminophen]    Problem List:   Patient Active Problem List   Diagnosis    Supervision of normal first pregnancy    Postpartum care following  delivery    Allergic rhinitis, seasonal    Anxiety    Backache    Bradycardia    Syncope and collapse        Surgical History:  Past Surgical History:   Procedure Laterality Date    PRIMARY C SECTION  2018    Procedure: PRIMARY C SECTION;  Surgeon: July Almaraz M.D.;  Location: LABOR AND DELIVERY;  Service: Obstetrics    DENTAL EXTRACTION(S)      wisdmon tooth at age 18    TONSILLECTOMY         Past Social Hx:   Social History     Tobacco Use    Smoking  "status: Former     Packs/day: 0.50     Years: 3.00     Pack years: 1.50     Types: Cigarettes    Smokeless tobacco: Never    Tobacco comments:     Quit smoking when she found out she waspregnant   Vaping Use    Vaping Use: Never used   Substance Use Topics    Alcohol use: No     Comment: rare    Drug use: Yes     Types: Inhaled, Marijuana     Comment: smoked marijuana when she was in high school          Problem list, medications, and allergies reviewed by myself today in Epic.     Objective:     /64   Pulse (!) 102   Temp 36.6 °C (97.9 °F) (Temporal)   Resp 14   Ht 1.448 m (4' 9\")   Wt 52.2 kg (115 lb)   SpO2 96%   BMI 24.89 kg/m²     Physical Exam  Vitals and nursing note reviewed.   Constitutional:       Appearance: Normal appearance.   HENT:      Head: Normocephalic and atraumatic.      Right Ear: Ear canal normal. Tympanic membrane is scarred.      Left Ear: Ear canal normal. Tympanic membrane is scarred.      Nose: Nose normal. No congestion or rhinorrhea.      Right Sinus: No maxillary sinus tenderness or frontal sinus tenderness.      Left Sinus: No maxillary sinus tenderness or frontal sinus tenderness.      Mouth/Throat:      Mouth: Mucous membranes are moist.      Pharynx: Uvula midline. Pharyngeal swelling and posterior oropharyngeal erythema present.      Tonsils: No tonsillar exudate. 0 on the right. 0 on the left.      Comments: White exudate on and adjacent to the uvula.  Eyes:      Conjunctiva/sclera: Conjunctivae normal.      Pupils: Pupils are equal, round, and reactive to light.   Cardiovascular:      Rate and Rhythm: Normal rate and regular rhythm.      Heart sounds: Normal heart sounds.   Pulmonary:      Effort: Pulmonary effort is normal.      Breath sounds: Normal breath sounds.   Abdominal:      General: Bowel sounds are normal.      Palpations: Abdomen is soft.   Lymphadenopathy:      Cervical: Cervical adenopathy present.   Skin:     General: Skin is warm and dry.      " Capillary Refill: Capillary refill takes less than 2 seconds.      Findings: No rash.   Neurological:      Mental Status: She is alert and oriented to person, place, and time.       Assessment/Plan:       Diagnosis and associated orders:   1. Strep pharyngitis  - POCT Rapid Strep A  - amoxicillin (AMOXIL) 500 MG Cap; Take 1 Capsule by mouth 2 times a day for 10 days.  Dispense: 20 Capsule; Refill: 0        Comments/MDM:     Rapid strep is positive  Supportive measures encouraged: Rest, increased oral hydration, NSAIDs/tylenol as needed per package instructions, lozenges, diet as tolerated.  Hygiene measures reviewed to prevent coinfection  Follow-up with primary care         Pt is clinically stable at today's acute urgent care visit.  No acute distress noted. Appropriate for outpatient management at this time.       Discussed DDx, management options (risks,benefits, and alternatives to planned treatment), natural progression and supportive care.  Expressed understanding and the treatment plan was agreed upon. Questions were encouraged and answered   Return to urgent care prn if new or worsening sx or if there is no improvement in condition prn.    Educated in Red flags and indications to immediately call 911 or present to the Emergency Department.   Advised the patient to follow-up with the primary care physician for recheck, reevaluation, and consideration of further management.      Please note that this dictation was created using voice recognition software. I have made a reasonable attempt to correct obvious errors, but I expect that there are errors of grammar and possibly content that I did not discover before finalizing the note.    This note was electronically signed by Marianne Mas DNP

## 2023-03-29 ENCOUNTER — OFFICE VISIT (OUTPATIENT)
Dept: URGENT CARE | Facility: CLINIC | Age: 28
End: 2023-03-29
Payer: MEDICAID

## 2023-03-29 VITALS
HEART RATE: 70 BPM | TEMPERATURE: 98 F | DIASTOLIC BLOOD PRESSURE: 62 MMHG | OXYGEN SATURATION: 94 % | BODY MASS INDEX: 24.16 KG/M2 | SYSTOLIC BLOOD PRESSURE: 94 MMHG | RESPIRATION RATE: 16 BRPM | HEIGHT: 57 IN | WEIGHT: 112 LBS

## 2023-03-29 DIAGNOSIS — H69.93 DYSFUNCTION OF BOTH EUSTACHIAN TUBES: ICD-10-CM

## 2023-03-29 DIAGNOSIS — J06.9 VIRAL URI WITH COUGH: ICD-10-CM

## 2023-03-29 PROCEDURE — 99213 OFFICE O/P EST LOW 20 MIN: CPT | Performed by: PHYSICIAN ASSISTANT

## 2023-03-29 RX ORDER — FLUTICASONE PROPIONATE 50 MCG
2 SPRAY, SUSPENSION (ML) NASAL DAILY
Qty: 16 G | Refills: 0 | Status: SHIPPED | OUTPATIENT
Start: 2023-03-29 | End: 2023-04-11

## 2023-03-29 RX ORDER — DEXTROMETHORPHAN HYDROBROMIDE AND PROMETHAZINE HYDROCHLORIDE 15; 6.25 MG/5ML; MG/5ML
5 SYRUP ORAL EVERY 4 HOURS PRN
Qty: 120 ML | Refills: 0 | Status: SHIPPED | OUTPATIENT
Start: 2023-03-29 | End: 2023-04-11

## 2023-03-29 RX ORDER — BENZONATATE 100 MG/1
100 CAPSULE ORAL 3 TIMES DAILY PRN
Qty: 60 CAPSULE | Refills: 0 | Status: SHIPPED | OUTPATIENT
Start: 2023-03-29 | End: 2023-04-11

## 2023-03-29 RX ORDER — METHYLPREDNISOLONE 4 MG/1
TABLET ORAL
Qty: 1 EACH | Refills: 0 | Status: SHIPPED | OUTPATIENT
Start: 2023-03-29 | End: 2023-04-11

## 2023-03-29 ASSESSMENT — ENCOUNTER SYMPTOMS
ABDOMINAL PAIN: 0
SINUS PAIN: 0
CHILLS: 0
SPUTUM PRODUCTION: 1
VOMITING: 0
DIARRHEA: 0
NAUSEA: 0
FLANK PAIN: 0
WHEEZING: 0
FEVER: 0
SORE THROAT: 0
COUGH: 1
SHORTNESS OF BREATH: 0

## 2023-03-29 NOTE — PROGRESS NOTES
Subjective:   Sylwia Downing  is a 27 y.o. female who presents for Cough, Nasal Congestion, and Dyspareunia (Due to IUD )      Cough  Pertinent negatives include no chills, ear pain (fullness), fever, rash, sore throat (irritation), shortness of breath or wheezing.   Dyspareunia  Associated symptoms include congestion and coughing. Pertinent negatives include no abdominal pain (notes mild cramping, denies pain), chills, fever, nausea, rash, sore throat (irritation) or vomiting.   Patient presents urgent care noting last 2 days of symptoms of upper respiratory infection.  Complains of sinus congestion and cough.  Notes cough can be phlegmy as well as somewhat dry.  Denies fevers chills.  Denies ear pain but complains of muffled hearing and significant pressure to bilateral ears.  Denies sore throat but irritation secondary to drainage.  Patient states she has had sinus congestion for a few days prior but noted significant sinus drainage over the last couple of days.  She denies vomiting abdominal pain or diarrhea.  Patient denies history of asthma pneumonia or COVID.  Patient also mentions that she has had some very mild suprapubic cramping secondary to intercourse a few days ago.  She states her partner was able to feel her IUD and she is felt some suprapubic cramping since.  She denies any abnormal vaginal discharge.  Denies any abnormal vaginal bleeding.  She denies any symptoms of UTI.  She does have a follow-up appointment with PCP in less than 2 weeks.  IUD has been in for 5 years, recommendation to take out by 10 years.    Review of Systems   Constitutional:  Negative for chills and fever.   HENT:  Positive for congestion. Negative for ear pain (fullness), sinus pain and sore throat (irritation).    Respiratory:  Positive for cough and sputum production. Negative for shortness of breath and wheezing.    Gastrointestinal:  Negative for abdominal pain (notes mild cramping, denies pain), diarrhea, nausea  "and vomiting.   Genitourinary:  Positive for dyspareunia. Negative for dysuria, flank pain, frequency, hematuria and urgency.        Denies vaginal bleeding   Skin:  Negative for rash.     Allergies   Allergen Reactions    Strawberry Anaphylaxis    Tape Hives     Adhesive tape     Vicodin [Hydrocodone-Acetaminophen]         Objective:   BP 94/62 (BP Location: Left arm, Patient Position: Sitting, BP Cuff Size: Adult)   Pulse 70   Temp 36.7 °C (98 °F) (Temporal)   Resp 16   Ht 1.448 m (4' 9\")   Wt 50.8 kg (112 lb)   SpO2 94%   BMI 24.24 kg/m²     Physical Exam  Vitals and nursing note reviewed.   Constitutional:       General: She is not in acute distress.     Appearance: She is well-developed. She is not diaphoretic.   HENT:      Head: Normocephalic and atraumatic.      Right Ear: Ear canal and external ear normal. Tympanic membrane is bulging.      Left Ear: Ear canal and external ear normal. Tympanic membrane is bulging.      Nose: Nose normal.      Mouth/Throat:      Mouth: Mucous membranes are moist.      Pharynx: Uvula midline. Posterior oropharyngeal erythema ( mild PND) present. No oropharyngeal exudate.      Tonsils: No tonsillar abscesses.   Eyes:      General: Lids are normal. No scleral icterus.        Right eye: No discharge.         Left eye: No discharge.      Conjunctiva/sclera: Conjunctivae normal.   Pulmonary:      Effort: Pulmonary effort is normal. No respiratory distress.      Breath sounds: Normal breath sounds. No stridor. No decreased breath sounds, wheezing, rhonchi or rales.   Musculoskeletal:         General: Normal range of motion.      Cervical back: Neck supple.   Skin:     General: Skin is warm and dry.      Coloration: Skin is not pale.      Findings: No erythema.   Neurological:      Mental Status: She is alert and oriented to person, place, and time. She is not disoriented.   Psychiatric:         Speech: Speech normal.         Behavior: Behavior normal.       Assessment/Plan: "   1. Viral URI with cough  - promethazine-dextromethorphan (PROMETHAZINE-DM) 6.25-15 MG/5ML syrup; Take 5 mL by mouth every four hours as needed for Cough.  Dispense: 120 mL; Refill: 0  - benzonatate (TESSALON) 100 MG Cap; Take 1 Capsule by mouth 3 times a day as needed for Cough.  Dispense: 60 Capsule; Refill: 0  - fluticasone (FLONASE) 50 MCG/ACT nasal spray; Administer 2 Sprays into affected nostril(S) every day.  Dispense: 16 g; Refill: 0    2. Dysfunction of both eustachian tubes  - methylPREDNISolone (MEDROL DOSEPAK) 4 MG Tablet Therapy Pack; Take as directed on package.  Dispense one package.  Dispense: 1 Each; Refill: 0  Supportive care is reviewed with patient/caregiver - recommend to push PO fluids and electrolytes, Cautioned regarding potential side effects of steroid, avoid nsaids while using  Cautioned regarding potential for sedation with medication.  Recommendation follow-up with PCP, [facility] or the emergency department for treatment of IUD issues.    I have worn an N95 mask, gloves and eye protection for the entire encounter with this patient.     Differential diagnosis, natural history, supportive care, and indications for immediate follow-up discussed.

## 2023-04-11 ENCOUNTER — OFFICE VISIT (OUTPATIENT)
Dept: MEDICAL GROUP | Facility: MEDICAL CENTER | Age: 28
End: 2023-04-11
Attending: INTERNAL MEDICINE
Payer: MEDICAID

## 2023-04-11 ENCOUNTER — HOSPITAL ENCOUNTER (OUTPATIENT)
Facility: MEDICAL CENTER | Age: 28
End: 2023-04-11
Attending: INTERNAL MEDICINE
Payer: MEDICAID

## 2023-04-11 VITALS
RESPIRATION RATE: 16 BRPM | OXYGEN SATURATION: 100 % | WEIGHT: 113 LBS | HEART RATE: 94 BPM | BODY MASS INDEX: 24.38 KG/M2 | SYSTOLIC BLOOD PRESSURE: 112 MMHG | HEIGHT: 57 IN | TEMPERATURE: 97.3 F | DIASTOLIC BLOOD PRESSURE: 70 MMHG

## 2023-04-11 DIAGNOSIS — Z87.42 HISTORY OF ABNORMAL CERVICAL PAP SMEAR: ICD-10-CM

## 2023-04-11 DIAGNOSIS — R11.0 NAUSEA: ICD-10-CM

## 2023-04-11 DIAGNOSIS — Z97.5 IUD (INTRAUTERINE DEVICE) IN PLACE: ICD-10-CM

## 2023-04-11 DIAGNOSIS — Z12.4 SCREENING FOR MALIGNANT NEOPLASM OF CERVIX: ICD-10-CM

## 2023-04-11 PROBLEM — M54.9 BACKACHE: Status: RESOLVED | Noted: 2017-08-14 | Resolved: 2023-04-11

## 2023-04-11 PROBLEM — Z34.00 SUPERVISION OF NORMAL FIRST PREGNANCY, ANTEPARTUM: Status: RESOLVED | Noted: 2018-05-16 | Resolved: 2023-04-11

## 2023-04-11 PROCEDURE — 99204 OFFICE O/P NEW MOD 45 MIN: CPT | Mod: 25 | Performed by: INTERNAL MEDICINE

## 2023-04-11 PROCEDURE — 87491 CHLMYD TRACH DNA AMP PROBE: CPT

## 2023-04-11 PROCEDURE — 88175 CYTOPATH C/V AUTO FLUID REDO: CPT

## 2023-04-11 PROCEDURE — 99213 OFFICE O/P EST LOW 20 MIN: CPT | Mod: 25 | Performed by: INTERNAL MEDICINE

## 2023-04-11 PROCEDURE — 87591 N.GONORRHOEAE DNA AMP PROB: CPT

## 2023-04-11 NOTE — ASSESSMENT & PLAN NOTE
She reports having an abnormal Pap smear in 2018, states was recommended to have follow-up Pap smear in a year but never did.  A few weeks ago became concerned because after sexual activity she had a lot of pelvic pain that lasted for a few hours.  The next day she had bleeding and ended up starting her period with a heavy flow.  Has an IUD in place and wanted to make sure it was not the cause.  Got it put in in 2018 and has never had any issues with it.  Has 1 sexual partner and denies any abnormal discharge or bleeding.

## 2023-04-11 NOTE — ASSESSMENT & PLAN NOTE
"She reports that for the past 5 years she has typically woken up in the mornings with some nausea.  It tends to last for few hours and then resolves on its own.  Occasionally she will take Zofran for this.  She denies any associated heartburn or acid reflux.  Rarely she will have an episode of vomiting.  Symptoms tend to resolve by 8 AM.  Sometimes she gets better if she eats but not always.  Bowel movements are normal and she denies melena or hematochezia.  Thinks she is a little sensitive to dairy and may have lactose intolerance but otherwise denies any food triggers for her nausea.  Most of the time is \"able to push past it\" and still function throughout her day without major issues.  "

## 2023-04-11 NOTE — PROGRESS NOTES
"Sylwia Downing is a 27 y.o. female here for PAP, establish care  HPI:  no previous PCP  History of abnormal cervical Pap smear  She reports having an abnormal Pap smear in 2018, states was recommended to have follow-up Pap smear in a year but never did.  A few weeks ago became concerned because after sexual activity she had a lot of pelvic pain that lasted for a few hours.  The next day she had bleeding and ended up starting her period with a heavy flow.  Has an IUD in place and wanted to make sure it was not the cause.  Got it put in in 2018 and has never had any issues with it.  Has 1 sexual partner and denies any abnormal discharge or bleeding.      IUD (intrauterine device) in place  Paragard, placed 2018    Nausea  She reports that for the past 5 years she has typically woken up in the mornings with some nausea.  It tends to last for few hours and then resolves on its own.  Occasionally she will take Zofran for this.  She denies any associated heartburn or acid reflux.  Rarely she will have an episode of vomiting.  Symptoms tend to resolve by 8 AM.  Sometimes she gets better if she eats but not always.  Bowel movements are normal and she denies melena or hematochezia.  Thinks she is a little sensitive to dairy and may have lactose intolerance but otherwise denies any food triggers for her nausea.  Most of the time is \"able to push past it\" and still function throughout her day without major issues.  Current medicines (including changes today)  Current Outpatient Medications   Medication Sig Dispense Refill    PARAGARD INTRAUTERINE COPPER IU by Intrauterine route.      ondansetron (ZOFRAN ODT) 4 MG TABLET DISPERSIBLE Take 1 Tab by mouth every 8 hours as needed. 10 Tab 0     No current facility-administered medications for this visit.     She  has a past medical history of Allergic rhinitis, seasonal (04/17/2014), Anxiety (12/18/2014), and Peptic ulcer.  She  has a past surgical history that includes " tonsillectomy; dental extraction(s); and primary c section (2018).  Social History     Tobacco Use    Smoking status: Former     Packs/day: 0.50     Years: 3.00     Pack years: 1.50     Types: Cigarettes     Quit date: 2014     Years since quittin.0    Smokeless tobacco: Never   Vaping Use    Vaping Use: Never used   Substance Use Topics    Alcohol use: Yes     Alcohol/week: 0.6 oz     Types: 1 Standard drinks or equivalent per week    Drug use: Yes     Frequency: 2.0 times per week     Types: Inhaled, Marijuana     Social History     Social History Narrative    Not on file     Family History   Problem Relation Age of Onset    Hyperlipidemia Mother     Hypertension Mother     Diabetes Mother     Stroke Mother     Cancer Father         had thyroid cancer    Breast Cancer Maternal Grandmother     Cancer Paternal Grandmother         unkown cancer    Cancer Paternal Grandfather         had lung canser          Objective:     Vitals:    23 0740   BP: 112/70   Pulse: 94   Resp: 16   Temp: 36.3 °C (97.3 °F)   SpO2: 100%     Body mass index is 24.45 kg/m².  Physical Exam:    Constitutional: Alert, no distress.  Skin: Warm, dry, good turgor, no rashes in visible areas.  Eye: Equal, round and reactive, conjunctiva clear, lids normal.  ENMT: Lips without lesions, good dentition, oropharynx clear, TM's clear bilaterally.  Neck: Trachea midline, no masses, no thyromegaly. No cervical or supraclavicular lymphadenopathy.  Respiratory: Unlabored respiratory effort, lungs clear to auscultation, no wheezes, no ronchi.  Cardiovascular: Regular rate and rhythm, no murmurs appreciated, no lower extremity edema.  Abdomen: Soft, non-tender, no masses, no hepatosplenomegaly.  Psych: Alert and oriented x3, normal affect and mood.  : external genitalia normal, cervix without discharge or lesions, no cervical motion tenderness, IUD strings visible        Assessment and Plan:   The following treatment plan was  discussed    1. IUD (intrauterine device) in place  Strings are visible on exam today.  Suspect that the episode of dyspareunia she had was more related to the start of her menstrual cycle.  We discussed monitoring symptoms.  If she starts to develop regular pelvic pain or dyspareunia we could consider getting an ultrasound to verify IUD placement    2. Nausea  Appears mild and chronic, no obvious triggers.  At this time we will continue to monitor    3. History of abnormal cervical Pap smear  Pap collected in clinic    4. Screening for malignant neoplasm of cervix  - THINPREP RFLX HPV ASCUS W/CTNG; Future        Followup: Return in about 1 year (around 4/11/2024) for annual.

## 2023-07-03 ENCOUNTER — OFFICE VISIT (OUTPATIENT)
Dept: MEDICAL GROUP | Facility: MEDICAL CENTER | Age: 28
End: 2023-07-03
Attending: NURSE PRACTITIONER
Payer: MEDICAID

## 2023-07-03 VITALS
SYSTOLIC BLOOD PRESSURE: 100 MMHG | OXYGEN SATURATION: 97 % | BODY MASS INDEX: 25.6 KG/M2 | HEIGHT: 56 IN | WEIGHT: 113.8 LBS | RESPIRATION RATE: 16 BRPM | HEART RATE: 68 BPM | DIASTOLIC BLOOD PRESSURE: 58 MMHG | TEMPERATURE: 98.1 F

## 2023-07-03 DIAGNOSIS — Z02.89 ENCOUNTER FOR COMPLETION OF FORM WITH PATIENT: ICD-10-CM

## 2023-07-03 PROCEDURE — 3074F SYST BP LT 130 MM HG: CPT | Performed by: NURSE PRACTITIONER

## 2023-07-03 PROCEDURE — 3078F DIAST BP <80 MM HG: CPT | Performed by: NURSE PRACTITIONER

## 2023-07-03 PROCEDURE — 99212 OFFICE O/P EST SF 10 MIN: CPT | Performed by: NURSE PRACTITIONER

## 2023-07-03 ASSESSMENT — PATIENT HEALTH QUESTIONNAIRE - PHQ9: CLINICAL INTERPRETATION OF PHQ2 SCORE: 0

## 2023-07-03 NOTE — LETTER
July 3, 2023    To Whom It May Concern:         This is confirmation that Sylwia Downing attended her scheduled appointment with LATESHA Roberts on 7/03/23. This letter is intended to show that patient requires emotional support animal to help with her mental health needs. Please allow on property as animal is medically necessary to patient for adequate mental health.         If you have any questions please do not hesitate to call me at the phone number listed below.    Sincerely,          DOREEN Roberts.P.RMaraN.  [phone]

## 2023-07-10 PROBLEM — Z02.89 ENCOUNTER FOR COMPLETION OF FORM WITH PATIENT: Status: ACTIVE | Noted: 2023-07-10

## 2023-07-10 NOTE — PROGRESS NOTES
Chief Complaint   Patient presents with    Paperwork     Service animal paper work.       Subjective:     HPI:   Sylwia Downign is a 27 y.o. female here to discuss the evaluation and management of:      Problem   Encounter for Completion of Form With Patient    Patient here with request to get a letter stating her dog helps provide emotional support.  Patient states that her dog significantly helps with her anxiety especially at night and makes her feel safe.  Patient states the apartment is threatening to have to have her pay or remove the dog if she does not obtain this letter.         ROS  See HPI     Allergies   Allergen Reactions    Hydrocodone-Acetaminophen      Other reaction(s): Liquid Vicodin Anaphalaxis    Strawberry Anaphylaxis    Tape Hives     Adhesive tape     Vicodin [Hydrocodone-Acetaminophen]        Current medicines (including changes today)  Current Outpatient Medications   Medication Sig Dispense Refill    PARAGARD INTRAUTERINE COPPER IU by Intrauterine route.      ondansetron (ZOFRAN ODT) 4 MG TABLET DISPERSIBLE Take 1 Tab by mouth every 8 hours as needed. 10 Tab 0     No current facility-administered medications for this visit.       Social History     Tobacco Use    Smoking status: Former     Packs/day: 0.50     Years: 3.00     Pack years: 1.50     Types: Cigarettes     Quit date: 2014     Years since quittin.2    Smokeless tobacco: Never   Vaping Use    Vaping Use: Never used   Substance Use Topics    Alcohol use: Yes     Alcohol/week: 0.6 oz     Types: 1 Standard drinks or equivalent per week    Drug use: Yes     Frequency: 2.0 times per week     Types: Inhaled, Marijuana       Patient Active Problem List    Diagnosis Date Noted    Encounter for completion of form with patient 07/10/2023    IUD (intrauterine device) in place 2023    Nausea 2023    History of abnormal cervical Pap smear 2023       Family History   Problem Relation Age of Onset    Hyperlipidemia  "Mother     Hypertension Mother     Diabetes Mother     Stroke Mother     Cancer Father         had thyroid cancer    Breast Cancer Maternal Grandmother     Cancer Paternal Grandmother         unkown cancer    Cancer Paternal Grandfather         had lung canser          Objective:     /58 (BP Location: Right arm, Patient Position: Sitting, BP Cuff Size: Adult)   Pulse 68   Temp 36.7 °C (98.1 °F) (Temporal)   Resp 16   Ht 1.422 m (4' 8\")   Wt 51.6 kg (113 lb 12.8 oz)   SpO2 97%  Body mass index is 25.51 kg/m².    Physical Exam:  Physical Exam  Vitals reviewed.   Constitutional:       General: She is awake.      Appearance: Normal appearance. She is well-developed.   HENT:      Head: Normocephalic.   Eyes:      Conjunctiva/sclera: Conjunctivae normal.   Cardiovascular:      Rate and Rhythm: Normal rate.   Pulmonary:      Effort: Pulmonary effort is normal. No respiratory distress.   Musculoskeletal:      Cervical back: Neck supple.   Neurological:      Mental Status: She is alert and oriented to person, place, and time.   Psychiatric:         Mood and Affect: Mood normal.         Behavior: Behavior normal. Behavior is cooperative.              Assessment and Plan:     The following treatment plan was discussed:    Problem List Items Addressed This Visit       Encounter for completion of form with patient     Ongoing-  Given that patient receives emotional support for animal I provided letter requesting that she be allowed to keep her dog as it is important to her mental health.            Any change or worsening of signs or symptoms, patient encouraged to follow-up or report to emergency room for further evaluation. Patient verbalizes understanding and agrees.    Follow-Up: No follow-ups on file.      PLEASE NOTE: This dictation was created using voice recognition software. I have made every reasonable attempt to correct obvious errors, but I expect that there are errors of grammar and possibly content that " I did not discover before finalizing the note.

## 2023-07-10 NOTE — ASSESSMENT & PLAN NOTE
Ongoing-  Given that patient receives emotional support for animal I provided letter requesting that she be allowed to keep her dog as it is important to her mental health.

## 2023-11-02 ENCOUNTER — OFFICE VISIT (OUTPATIENT)
Dept: MEDICAL GROUP | Facility: MEDICAL CENTER | Age: 28
End: 2023-11-02
Attending: FAMILY MEDICINE
Payer: MEDICAID

## 2023-11-02 VITALS
HEART RATE: 88 BPM | SYSTOLIC BLOOD PRESSURE: 112 MMHG | BODY MASS INDEX: 25.19 KG/M2 | WEIGHT: 112 LBS | RESPIRATION RATE: 16 BRPM | OXYGEN SATURATION: 97 % | DIASTOLIC BLOOD PRESSURE: 78 MMHG | HEIGHT: 56 IN | TEMPERATURE: 97.9 F

## 2023-11-02 DIAGNOSIS — L70.0 ACNE VULGARIS: ICD-10-CM

## 2023-11-02 DIAGNOSIS — N92.6 MISSED MENSES: ICD-10-CM

## 2023-11-02 LAB
POCT INT CON NEG: NEGATIVE
POCT INT CON POS: POSITIVE
POCT URINE PREGNANCY TEST: NEGATIVE

## 2023-11-02 PROCEDURE — 81025 URINE PREGNANCY TEST: CPT | Performed by: FAMILY MEDICINE

## 2023-11-02 PROCEDURE — 99213 OFFICE O/P EST LOW 20 MIN: CPT | Performed by: FAMILY MEDICINE

## 2023-11-02 PROCEDURE — 99214 OFFICE O/P EST MOD 30 MIN: CPT | Performed by: FAMILY MEDICINE

## 2023-11-02 PROCEDURE — 3074F SYST BP LT 130 MM HG: CPT | Performed by: FAMILY MEDICINE

## 2023-11-02 PROCEDURE — 3078F DIAST BP <80 MM HG: CPT | Performed by: FAMILY MEDICINE

## 2023-11-02 RX ORDER — CLINDAMYCIN PHOSPHATE 10 MG/G
GEL TOPICAL
COMMUNITY
End: 2023-11-02 | Stop reason: SDUPTHER

## 2023-11-02 RX ORDER — CLINDAMYCIN PHOSPHATE 10 MG/G
1 GEL TOPICAL 2 TIMES DAILY
Qty: 60 G | Refills: 1 | Status: SHIPPED | OUTPATIENT
Start: 2023-11-02

## 2023-11-02 NOTE — PROGRESS NOTES
"Subjective   Chief Complaint   Patient presents with    Menstrual Problem        HPI:   Sylwia presents today with    Missed menses  Patient reports generally having regular periods every 28 days or so.  LMP: 2023  She is concerned that she has not had her period yet  She is sexually active with 1 male partner; she endorses condom use as well as having an IUD in place  She states last few months have been particularly stressful financially- she is a  and its low season       Acne vulgaris  She has been prescribed clindamycin gel which was prescribed by previous provider. Is now due for refills. States medication is very helpful in controlling her acne.      Health Maintenance Due   Topic Date Due    Hepatitis C Screening  Never done    COVID-19 Vaccine (1) Never done    Influenza Vaccine (1) 2023       Objective   Social History     Tobacco Use    Smoking status: Former     Current packs/day: 0.00     Average packs/day: 0.5 packs/day for 3.0 years (1.5 ttl pk-yrs)     Types: Cigarettes     Start date: 2011     Quit date: 2014     Years since quittin.5    Smokeless tobacco: Never   Vaping Use    Vaping Use: Never used   Substance Use Topics    Alcohol use: Yes     Alcohol/week: 0.6 oz     Types: 1 Standard drinks or equivalent per week    Drug use: Yes     Frequency: 2.0 times per week     Types: Inhaled, Marijuana       Exam:  /78 (BP Location: Left arm, Patient Position: Sitting, BP Cuff Size: Adult)   Pulse 88   Temp 36.6 °C (97.9 °F) (Temporal)   Resp 16   Ht 1.42 m (4' 7.91\")   Wt 50.8 kg (112 lb)   SpO2 97%   BMI 25.19 kg/m²     Physical Exam  Constitutional: Alert, no distress  Skin: scatter comedones over chin  Eye: Conjunctiva clear, lids normal  Respiratory: Unlabored respiratory effort, no cough  MSK: Normal gait, moves all extremities  Psych: Alert and oriented x3, normal affect and mood    Allergies   Allergen Reactions    Hydrocodone-Acetaminophen      " Other reaction(s): Liquid Vicodin Anaphalaxis    Strawberry Anaphylaxis    Tape Hives     Adhesive tape     Vicodin [Hydrocodone-Acetaminophen]        [id number] - [place], [place] - [address]  [address] [place] [address]  Phone: [phone] Fax: [phone]    [id number] - [facility], [place] - [address]  [address] [place] [address]  Phone: [phone] Fax: [phone]    Current Outpatient Medications   Medication Sig Dispense Refill    clindamycin 1 % Gel Apply 1 Application topically 2 times a day. 60 g 1    PARAGARD INTRAUTERINE COPPER IU by Intrauterine route.      ondansetron (ZOFRAN ODT) 4 MG TABLET DISPERSIBLE Take 1 Tab by mouth every 8 hours as needed. 10 Tab 0     No current facility-administered medications for this visit.       Assessment & Plan    27 y.o. female with the following -     1. Missed menses  - POCT Pregnancy in office negative  -Reassured patient that amenorrhea likely secondary to hormonal imbalance from recent stressors.  - Discussed that IUD is very effective pregnancy prevention additionally she also uses condoms for STD prevention that should also prevent unwanted pregnancy.  - Encourage patient to follow-up if menses is not achieved in next few cycles to assess other factors that may be contributing to this.    2. Acne vulgaris  - Chronic; generally well controlled with antibiotics gel. Refill sent.  Encouraged continued skin care regimen including daily sunblock for skin cancer prevention as well as prevention of premature aging.  - clindamycin 1 % Gel; Apply 1 Application topically 2 times a day.  Dispense: 60 g; Refill: 1    Return if symptoms worsen or fail to improve.    Please note that this dictation was created using voice recognition software. I have made every reasonable attempt to correct obvious errors, but I expect that there are errors of grammar and possibly content that I did not discover before finalizing  the note.

## 2023-11-02 NOTE — ASSESSMENT & PLAN NOTE
Patient reports generally having regular periods every 28 days or so.  LMP: 9/27/2023  She is concerned that she has not had her period yet  She is sexually active with 1 male partner; she endorses condom use as well as having an IUD in place  She states last few months have been particularly stressful financially- she is a  and its low season

## 2023-11-02 NOTE — ASSESSMENT & PLAN NOTE
She has been prescribed clindamycin gel which was prescribed by previous provider. Is now due for refills. States medication is very helpful in controlling her acne.

## 2024-08-28 ENCOUNTER — APPOINTMENT (OUTPATIENT)
Dept: URGENT CARE | Facility: PHYSICIAN GROUP | Age: 29
End: 2024-08-28
Payer: MEDICAID

## 2025-03-07 ENCOUNTER — HOSPITAL ENCOUNTER (OUTPATIENT)
Facility: MEDICAL CENTER | Age: 30
End: 2025-03-07
Attending: INTERNAL MEDICINE
Payer: MEDICAID

## 2025-03-07 ENCOUNTER — OFFICE VISIT (OUTPATIENT)
Dept: MEDICAL GROUP | Facility: MEDICAL CENTER | Age: 30
End: 2025-03-07
Attending: INTERNAL MEDICINE
Payer: MEDICAID

## 2025-03-07 VITALS
SYSTOLIC BLOOD PRESSURE: 114 MMHG | TEMPERATURE: 97.6 F | HEART RATE: 76 BPM | DIASTOLIC BLOOD PRESSURE: 72 MMHG | BODY MASS INDEX: 23.95 KG/M2 | OXYGEN SATURATION: 96 % | WEIGHT: 111 LBS | RESPIRATION RATE: 16 BRPM | HEIGHT: 57 IN

## 2025-03-07 DIAGNOSIS — L70.0 ACNE VULGARIS: ICD-10-CM

## 2025-03-07 DIAGNOSIS — R10.2 PELVIC PAIN: ICD-10-CM

## 2025-03-07 PROBLEM — R11.0 NAUSEA: Status: RESOLVED | Noted: 2023-04-11 | Resolved: 2025-03-07

## 2025-03-07 PROBLEM — N92.6 MISSED MENSES: Status: RESOLVED | Noted: 2023-11-02 | Resolved: 2025-03-07

## 2025-03-07 PROBLEM — Z02.89 ENCOUNTER FOR COMPLETION OF FORM WITH PATIENT: Status: RESOLVED | Noted: 2023-07-10 | Resolved: 2025-03-07

## 2025-03-07 LAB
APPEARANCE UR: CLEAR
BILIRUB UR STRIP-MCNC: NORMAL MG/DL
COLOR UR AUTO: YELLOW
GLUCOSE UR STRIP.AUTO-MCNC: NORMAL MG/DL
KETONES UR STRIP.AUTO-MCNC: NORMAL MG/DL
LEUKOCYTE ESTERASE UR QL STRIP.AUTO: NORMAL
NITRITE UR QL STRIP.AUTO: NORMAL
PH UR STRIP.AUTO: 6.5 [PH] (ref 5–8)
PROT UR QL STRIP: NORMAL MG/DL
RBC UR QL AUTO: NORMAL
SP GR UR STRIP.AUTO: 1.01
UROBILINOGEN UR STRIP-MCNC: 0.2 MG/DL

## 2025-03-07 PROCEDURE — 3078F DIAST BP <80 MM HG: CPT | Performed by: INTERNAL MEDICINE

## 2025-03-07 PROCEDURE — 3074F SYST BP LT 130 MM HG: CPT | Performed by: INTERNAL MEDICINE

## 2025-03-07 PROCEDURE — 87510 GARDNER VAG DNA DIR PROBE: CPT

## 2025-03-07 PROCEDURE — 87491 CHLMYD TRACH DNA AMP PROBE: CPT

## 2025-03-07 PROCEDURE — 87660 TRICHOMONAS VAGIN DIR PROBE: CPT

## 2025-03-07 PROCEDURE — 81002 URINALYSIS NONAUTO W/O SCOPE: CPT | Performed by: INTERNAL MEDICINE

## 2025-03-07 PROCEDURE — 87591 N.GONORRHOEAE DNA AMP PROB: CPT

## 2025-03-07 PROCEDURE — 99214 OFFICE O/P EST MOD 30 MIN: CPT | Performed by: INTERNAL MEDICINE

## 2025-03-07 PROCEDURE — 87480 CANDIDA DNA DIR PROBE: CPT

## 2025-03-07 PROCEDURE — 87086 URINE CULTURE/COLONY COUNT: CPT

## 2025-03-07 RX ORDER — CLINDAMYCIN PHOSPHATE 10 MG/G
1 GEL TOPICAL 2 TIMES DAILY
Qty: 60 G | Refills: 5 | Status: SHIPPED | OUTPATIENT
Start: 2025-03-07

## 2025-03-07 SDOH — ECONOMIC STABILITY: TRANSPORTATION INSECURITY
IN THE PAST 12 MONTHS, HAS LACK OF TRANSPORTATION KEPT YOU FROM MEETINGS, WORK, OR FROM GETTING THINGS NEEDED FOR DAILY LIVING?: NO

## 2025-03-07 SDOH — ECONOMIC STABILITY: FOOD INSECURITY: WITHIN THE PAST 12 MONTHS, YOU WORRIED THAT YOUR FOOD WOULD RUN OUT BEFORE YOU GOT MONEY TO BUY MORE.: NEVER TRUE

## 2025-03-07 SDOH — HEALTH STABILITY: MENTAL HEALTH
STRESS IS WHEN SOMEONE FEELS TENSE, NERVOUS, ANXIOUS, OR CAN'T SLEEP AT NIGHT BECAUSE THEIR MIND IS TROUBLED. HOW STRESSED ARE YOU?: ONLY A LITTLE

## 2025-03-07 SDOH — ECONOMIC STABILITY: TRANSPORTATION INSECURITY
IN THE PAST 12 MONTHS, HAS LACK OF RELIABLE TRANSPORTATION KEPT YOU FROM MEDICAL APPOINTMENTS, MEETINGS, WORK OR FROM GETTING THINGS NEEDED FOR DAILY LIVING?: NO

## 2025-03-07 SDOH — ECONOMIC STABILITY: INCOME INSECURITY: IN THE LAST 12 MONTHS, WAS THERE A TIME WHEN YOU WERE NOT ABLE TO PAY THE MORTGAGE OR RENT ON TIME?: NO

## 2025-03-07 SDOH — ECONOMIC STABILITY: INCOME INSECURITY: HOW HARD IS IT FOR YOU TO PAY FOR THE VERY BASICS LIKE FOOD, HOUSING, MEDICAL CARE, AND HEATING?: SOMEWHAT HARD

## 2025-03-07 SDOH — HEALTH STABILITY: PHYSICAL HEALTH: ON AVERAGE, HOW MANY DAYS PER WEEK DO YOU ENGAGE IN MODERATE TO STRENUOUS EXERCISE (LIKE A BRISK WALK)?: 3 DAYS

## 2025-03-07 SDOH — ECONOMIC STABILITY: FOOD INSECURITY: WITHIN THE PAST 12 MONTHS, THE FOOD YOU BOUGHT JUST DIDN'T LAST AND YOU DIDN'T HAVE MONEY TO GET MORE.: NEVER TRUE

## 2025-03-07 SDOH — ECONOMIC STABILITY: HOUSING INSECURITY
IN THE LAST 12 MONTHS, WAS THERE A TIME WHEN YOU DID NOT HAVE A STEADY PLACE TO SLEEP OR SLEPT IN A SHELTER (INCLUDING NOW)?: NO

## 2025-03-07 SDOH — HEALTH STABILITY: PHYSICAL HEALTH: ON AVERAGE, HOW MANY MINUTES DO YOU ENGAGE IN EXERCISE AT THIS LEVEL?: 90 MIN

## 2025-03-07 SDOH — ECONOMIC STABILITY: TRANSPORTATION INSECURITY
IN THE PAST 12 MONTHS, HAS THE LACK OF TRANSPORTATION KEPT YOU FROM MEDICAL APPOINTMENTS OR FROM GETTING MEDICATIONS?: NO

## 2025-03-07 ASSESSMENT — LIFESTYLE VARIABLES
HOW OFTEN DO YOU HAVE SIX OR MORE DRINKS ON ONE OCCASION: LESS THAN MONTHLY
AUDIT-C TOTAL SCORE: 2
HOW OFTEN DO YOU HAVE A DRINK CONTAINING ALCOHOL: MONTHLY OR LESS
HOW MANY STANDARD DRINKS CONTAINING ALCOHOL DO YOU HAVE ON A TYPICAL DAY: 1 OR 2
SKIP TO QUESTIONS 9-10: 0

## 2025-03-07 ASSESSMENT — SOCIAL DETERMINANTS OF HEALTH (SDOH)
DO YOU BELONG TO ANY CLUBS OR ORGANIZATIONS SUCH AS CHURCH GROUPS UNIONS, FRATERNAL OR ATHLETIC GROUPS, OR SCHOOL GROUPS?: NO
DO YOU BELONG TO ANY CLUBS OR ORGANIZATIONS SUCH AS CHURCH GROUPS UNIONS, FRATERNAL OR ATHLETIC GROUPS, OR SCHOOL GROUPS?: NO
IN A TYPICAL WEEK, HOW MANY TIMES DO YOU TALK ON THE PHONE WITH FAMILY, FRIENDS, OR NEIGHBORS?: THREE TIMES A WEEK
HOW OFTEN DO YOU GET TOGETHER WITH FRIENDS OR RELATIVES?: ONCE A WEEK
IN THE PAST 12 MONTHS, HAS THE ELECTRIC, GAS, OIL, OR WATER COMPANY THREATENED TO SHUT OFF SERVICE IN YOUR HOME?: NO
HOW OFTEN DO YOU HAVE A DRINK CONTAINING ALCOHOL: MONTHLY OR LESS
HOW HARD IS IT FOR YOU TO PAY FOR THE VERY BASICS LIKE FOOD, HOUSING, MEDICAL CARE, AND HEATING?: SOMEWHAT HARD
HOW OFTEN DO YOU ATTEND CHURCH OR RELIGIOUS SERVICES?: NEVER
HOW MANY DRINKS CONTAINING ALCOHOL DO YOU HAVE ON A TYPICAL DAY WHEN YOU ARE DRINKING: 1 OR 2
HOW OFTEN DO YOU HAVE SIX OR MORE DRINKS ON ONE OCCASION: LESS THAN MONTHLY
ARE YOU MARRIED, WIDOWED, DIVORCED, SEPARATED, NEVER MARRIED, OR LIVING WITH A PARTNER?: PATIENT DECLINED
IN A TYPICAL WEEK, HOW MANY TIMES DO YOU TALK ON THE PHONE WITH FAMILY, FRIENDS, OR NEIGHBORS?: THREE TIMES A WEEK
WITHIN THE PAST 12 MONTHS, YOU WORRIED THAT YOUR FOOD WOULD RUN OUT BEFORE YOU GOT THE MONEY TO BUY MORE: NEVER TRUE
HOW OFTEN DO YOU ATTEND CHURCH OR RELIGIOUS SERVICES?: NEVER
ARE YOU MARRIED, WIDOWED, DIVORCED, SEPARATED, NEVER MARRIED, OR LIVING WITH A PARTNER?: PATIENT DECLINED
HOW OFTEN DO YOU ATTENT MEETINGS OF THE CLUB OR ORGANIZATION YOU BELONG TO?: NEVER
HOW OFTEN DO YOU GET TOGETHER WITH FRIENDS OR RELATIVES?: ONCE A WEEK
HOW OFTEN DO YOU ATTENT MEETINGS OF THE CLUB OR ORGANIZATION YOU BELONG TO?: NEVER

## 2025-03-07 NOTE — PROGRESS NOTES
Subjective:   Sylwia Downing is a 29 y.o. female here today for med refill, pelvic pain    Pelvic pain  She reports that for the past 4 to 5 months she has had increased pelvic pain.  She reports pain specifically worse after sex and sometimes she also gets some vaginal bleeding after she is sexually active.  Additionally, she will have some heaviness and cramping even when she is not on her period and she is not able to do crunches anymore because it causes pelvic pain.  She denies any abnormal vaginal discharge.  She reports her menstrual cycles have been more irregular and slightly heavier than usual (3 to 4 days of heavy bleeding followed by 5 to 6 days of light bleeding/spotting).  She denies fevers and chills.  Bowel movements have been normal and she denies upper abdominal pain.  She denies dysuria except after she is sexually active.  She has been with the same male partner for about 1 year now.  She reports that most recently after sexual activity, her partner also reported pain and that there was a puncture wound on the tip of his penis and she was concerned that her IUD may not be correctly positioned.  She had it placed approximately 6 years ago.  She would like to get her IUD evaluated.  She is not sure if she wants to keep it in the long-term, but prefers not to have it removed today.      Acne vulgaris  She continues to have some facial acne which is well-controlled with her clindamycin gel.  She is requesting a refill today.       Current medicines (including changes today)  Current Outpatient Medications   Medication Sig Dispense Refill    clindamycin 1 % Gel Apply 1 Application topically 2 times a day. 60 g 5    PARAGARD INTRAUTERINE COPPER IU by Intrauterine route.       No current facility-administered medications for this visit.     She  has a past medical history of Allergic rhinitis, seasonal (04/17/2014), Anxiety (12/18/2014), and Peptic ulcer.         Objective:     Vitals:    03/07/25  1300   BP: 114/72   Pulse: 76   Resp: 16   Temp: 36.4 °C (97.6 °F)   SpO2: 96%     Body mass index is 24.02 kg/m².   Physical Exam:  Constitutional: Alert, no distress.  Skin: Warm, dry, good turgor, no rashes in visible areas.  Eye: Equal, round and reactive, conjunctiva clear, lids normal.  ENMT: Lips without lesions, good dentition, oropharynx clear, TM's clear bilaterally  Neck: Trachea midline, no masses, no thyromegaly. No cervical or supraclavicular lymphadenopathy  Respiratory: Unlabored respiratory effort, lungs clear to auscultation, no wheezes, no ronchi.  Cardiovascular: Regular rate and rhythm, no murmurs appreciated, no lower extremity edema  Abdomen: Soft, mild tenderness to palpation over suprapubic region without rebound or guarding, no masses, no hepatosplenomegaly.  Psych: Alert and oriented x3, normal affect and mood.      Results and Imaging:    Latest Reference Range & Units 03/07/25 13:38   POC Color Negative  yellow   POC Appearance Negative  clear   POC Specific Gravity <1.005 - >1.030  1.010   POC Urine PH 5.0 - 8.0  6.5   POC Glucose Negative mg/dL neg   POC Ketones Negative mg/dL neg   POC Protein Negative mg/dL neg   POC Nitrites Negative  neg   POC Leukocyte Esterase Negative  large   POC Blood Negative  neg   POC Bilirubin Negative mg/dL neg   POC Urobiligen Negative (0.2) mg/dL 0.2       Assessment and Plan:   The following treatment plan was discussed    1. Pelvic pain  Differential is broad.  We discussed ruling out vaginitis or cervicitis with testing below.  Discussed evaluating IUD positioning as well as uterus and ovaries with ultrasound.  Interestingly, UA done in clinic showed large leuk esterase therefore will send urine for culture to make sure this is not cystitis, although unlikely.  If all of the testing below comes back normal, discussed referral to gynecology for further evaluation.  - US-PELVIC COMPLETE (TRANSABDOMINAL/TRANSVAGINAL) (COMBO); Future  - Chlamydia/GC, PCR  (Urine); Future  - VAGINAL PATHOGENS DNA PANEL; Future  - POCT Urinalysis  - URINE CULTURE(NEW); Future    2. Acne vulgaris  Stable, well controlled with current meds which were refilled today.   - clindamycin 1 % Gel; Apply 1 Application topically 2 times a day.  Dispense: 60 g; Refill: 5        Followup: Return if symptoms worsen or fail to improve.

## 2025-03-07 NOTE — ASSESSMENT & PLAN NOTE
She continues to have some facial acne which is well-controlled with her clindamycin gel.  She is requesting a refill today.

## 2025-03-07 NOTE — ASSESSMENT & PLAN NOTE
She reports that for the past 4 to 5 months she has had increased pelvic pain.  She reports pain specifically worse after sex and sometimes she also gets some vaginal bleeding after she is sexually active.  Additionally, she will have some heaviness and cramping even when she is not on her period and she is not able to do crunches anymore because it causes pelvic pain.  She denies any abnormal vaginal discharge.  She reports her menstrual cycles have been more irregular and slightly heavier than usual (3 to 4 days of heavy bleeding followed by 5 to 6 days of light bleeding/spotting).  She denies fevers and chills.  Bowel movements have been normal and she denies upper abdominal pain.  She denies dysuria except after she is sexually active.  She has been with the same male partner for about 1 year now.  She reports that most recently after sexual activity, her partner also reported pain and that there was a puncture wound on the tip of his penis and she was concerned that her IUD may not be correctly positioned.  She had it placed approximately 6 years ago.  She would like to get her IUD evaluated.  She is not sure if she wants to keep it in the long-term, but prefers not to have it removed today.

## 2025-03-08 LAB
C TRACH DNA GENITAL QL NAA+PROBE: NEGATIVE
CANDIDA DNA VAG QL PROBE+SIG AMP: NEGATIVE
G VAGINALIS DNA VAG QL PROBE+SIG AMP: POSITIVE
N GONORRHOEA DNA GENITAL QL NAA+PROBE: NEGATIVE
SPECIMEN SOURCE: NORMAL
T VAGINALIS DNA VAG QL PROBE+SIG AMP: NEGATIVE

## 2025-03-10 LAB
BACTERIA UR CULT: NORMAL
SIGNIFICANT IND 70042: NORMAL
SITE SITE: NORMAL
SOURCE SOURCE: NORMAL

## 2025-03-12 ENCOUNTER — RESULTS FOLLOW-UP (OUTPATIENT)
Dept: INTERNAL MEDICINE | Facility: OTHER | Age: 30
End: 2025-03-12
Payer: MEDICAID

## 2025-03-12 DIAGNOSIS — N76.0 BACTERIAL VAGINITIS: ICD-10-CM

## 2025-03-12 DIAGNOSIS — B96.89 BACTERIAL VAGINITIS: ICD-10-CM

## 2025-03-12 RX ORDER — METRONIDAZOLE 500 MG/1
500 TABLET ORAL 2 TIMES DAILY
Qty: 14 TABLET | Refills: 0 | Status: SHIPPED | OUTPATIENT
Start: 2025-03-12 | End: 2025-03-14 | Stop reason: SDUPTHER

## 2025-03-14 RX ORDER — METRONIDAZOLE 500 MG/1
500 TABLET ORAL 2 TIMES DAILY
Qty: 14 TABLET | Refills: 0 | Status: SHIPPED | OUTPATIENT
Start: 2025-03-14 | End: 2025-03-21

## 2025-04-10 ENCOUNTER — APPOINTMENT (OUTPATIENT)
Dept: RADIOLOGY | Facility: MEDICAL CENTER | Age: 30
End: 2025-04-10
Attending: INTERNAL MEDICINE
Payer: MEDICAID

## 2025-04-10 DIAGNOSIS — R10.2 PELVIC PAIN: ICD-10-CM

## 2025-04-10 PROCEDURE — 76830 TRANSVAGINAL US NON-OB: CPT

## 2025-04-11 ENCOUNTER — RESULTS FOLLOW-UP (OUTPATIENT)
Dept: INTERNAL MEDICINE | Facility: OTHER | Age: 30
End: 2025-04-11

## (undated) DEVICE — SUTURE 0 VICRYL PLUS CT 36 (36PK/BX)"

## (undated) DEVICE — TUBING CLEARLINK DUO-VENT - C-FLO (48EA/CA)

## (undated) DEVICE — PENCIL ELECTSURG 10FT HLSTR - WITH BLADE (50EA/CA)

## (undated) DEVICE — SUTURE 2-0 CHROMIC GUT CT-1 27 (36PK/BX)"

## (undated) DEVICE — CANISTER SUCTION 3000ML MECHANICAL FILTER AUTO SHUTOFF MEDI-VAC NONSTERILE LF DISP  (40EA/CA)

## (undated) DEVICE — TRAY BLADDER CARE W/ 16 FR FOLEY CATHETER STATLOCK  (10/CA)

## (undated) DEVICE — SUTURE 0 VICRYL PLUS CT-1 - 36 INCH (36/BX)

## (undated) DEVICE — STAPLER SKIN DISP - (6/BX 10BX/CA) VISISTAT

## (undated) DEVICE — TAPE CLOTH MEDIPORE 6 INCH - (12RL/CA)

## (undated) DEVICE — HEAD HOLDER JUNIOR/ADULT

## (undated) DEVICE — SODIUM CHL IRRIGATION 0.9% 1000ML (12EA/CA)

## (undated) DEVICE — DETERGENT RENUZYME PLUS 10 OZ PACKET (50/BX)

## (undated) DEVICE — SET EXTENSION WITH 2 PORTS (48EA/CA) ***PART #2C8610 IS A SUBSTITUTE*****

## (undated) DEVICE — CHLORAPREP 26 ML APPLICATOR - ORANGE TINT(25/CA)

## (undated) DEVICE — SUTURE 3-0 VICRYL PLUS CT-1 - 36 INCH (36/BX)

## (undated) DEVICE — DRESSING INTERCEED ABSORBABLE ADHESION BARRIER TC7 (10EA/CA)

## (undated) DEVICE — KIT  I.V. START (100EA/CA)

## (undated) DEVICE — CATHETER IV NON-SAFETY 18 GA X 1 1/4 (50/BX 4BX/CA)

## (undated) DEVICE — BLANKET UNDERBODY FULL ACCES - (5/CA)

## (undated) DEVICE — LACTATED RINGERS INJ 1000 ML - (14EA/CA 60CA/PF)

## (undated) DEVICE — PACK C-SECTION (2EA/CA)

## (undated) DEVICE — PAD LAP STERILE 18 X 18 - (5/PK 40PK/CA)

## (undated) DEVICE — SUTURE 3-0 MONOCRYL RB-1 (36PK/BX)

## (undated) DEVICE — TRAY SPINAL ANESTHESIA NON-SAFETY (10/CA)

## (undated) DEVICE — SHEATH RO 4F 10CM (10EA/BX)

## (undated) DEVICE — WATER IRRIG. STER. 1500 ML - (9/CA)

## (undated) DEVICE — PACK ROOM TURNOVER L&D (12/CA)

## (undated) DEVICE — GLOVE BIOGEL SZ 8 SURGICAL PF LTX - (50PR/BX 4BX/CA)

## (undated) DEVICE — 3-0 CHROMIC CT-1/CP-1

## (undated) DEVICE — SLEEVE, SEQUENTIAL CALF REG

## (undated) DEVICE — SUTURE 1 CHROMIC CTX ETHICON - (36PK/BX)